# Patient Record
Sex: MALE | URBAN - METROPOLITAN AREA
[De-identification: names, ages, dates, MRNs, and addresses within clinical notes are randomized per-mention and may not be internally consistent; named-entity substitution may affect disease eponyms.]

---

## 2017-05-24 ENCOUNTER — OUTPATIENT (OUTPATIENT)
Dept: OUTPATIENT SERVICES | Facility: HOSPITAL | Age: 82
LOS: 1 days | Discharge: HOME | End: 2017-05-24
Payer: MEDICARE

## 2017-05-24 VITALS
TEMPERATURE: 97 F | HEART RATE: 48 BPM | OXYGEN SATURATION: 99 % | DIASTOLIC BLOOD PRESSURE: 76 MMHG | HEIGHT: 70 IN | SYSTOLIC BLOOD PRESSURE: 155 MMHG | WEIGHT: 184.97 LBS | RESPIRATION RATE: 20 BRPM

## 2017-05-24 DIAGNOSIS — E78.5 HYPERLIPIDEMIA, UNSPECIFIED: ICD-10-CM

## 2017-05-24 DIAGNOSIS — E86.0 DEHYDRATION: ICD-10-CM

## 2017-05-24 DIAGNOSIS — Z98.890 OTHER SPECIFIED POSTPROCEDURAL STATES: Chronic | ICD-10-CM

## 2017-05-24 DIAGNOSIS — H91.93 UNSPECIFIED HEARING LOSS, BILATERAL: ICD-10-CM

## 2017-05-24 DIAGNOSIS — Z90.49 ACQUIRED ABSENCE OF OTHER SPECIFIED PARTS OF DIGESTIVE TRACT: Chronic | ICD-10-CM

## 2017-05-24 DIAGNOSIS — I10 ESSENTIAL (PRIMARY) HYPERTENSION: ICD-10-CM

## 2017-05-24 DIAGNOSIS — Z01.818 ENCOUNTER FOR OTHER PREPROCEDURAL EXAMINATION: ICD-10-CM

## 2017-05-24 DIAGNOSIS — M17.11 UNILATERAL PRIMARY OSTEOARTHRITIS, RIGHT KNEE: ICD-10-CM

## 2017-05-24 DIAGNOSIS — D62 ACUTE POSTHEMORRHAGIC ANEMIA: ICD-10-CM

## 2017-05-24 DIAGNOSIS — Z90.49 ACQUIRED ABSENCE OF OTHER SPECIFIED PARTS OF DIGESTIVE TRACT: ICD-10-CM

## 2017-05-24 DIAGNOSIS — Z90.89 ACQUIRED ABSENCE OF OTHER ORGANS: Chronic | ICD-10-CM

## 2017-05-24 DIAGNOSIS — Z90.3 ACQUIRED ABSENCE OF STOMACH [PART OF]: Chronic | ICD-10-CM

## 2017-05-24 LAB
ANION GAP SERPL CALC-SCNC: 6 MMOL/L — SIGNIFICANT CHANGE UP (ref 5–17)
APPEARANCE UR: CLEAR — SIGNIFICANT CHANGE UP
BACTERIA # UR AUTO: (no result)
BASOPHILS # BLD AUTO: 0.1 K/UL — SIGNIFICANT CHANGE UP (ref 0–0.2)
BASOPHILS NFR BLD AUTO: 1 % — SIGNIFICANT CHANGE UP (ref 0–2)
BILIRUB UR-MCNC: NEGATIVE — SIGNIFICANT CHANGE UP
BLD GP AB SCN SERPL QL: SIGNIFICANT CHANGE UP
BUN SERPL-MCNC: 20 MG/DL — SIGNIFICANT CHANGE UP (ref 7–23)
CALCIUM SERPL-MCNC: 8.9 MG/DL — SIGNIFICANT CHANGE UP (ref 8.5–10.1)
CHLORIDE SERPL-SCNC: 106 MMOL/L — SIGNIFICANT CHANGE UP (ref 96–108)
CO2 SERPL-SCNC: 29 MMOL/L — SIGNIFICANT CHANGE UP (ref 22–31)
COLOR SPEC: YELLOW — SIGNIFICANT CHANGE UP
CREAT SERPL-MCNC: 1 MG/DL — SIGNIFICANT CHANGE UP (ref 0.5–1.3)
DIFF PNL FLD: (no result)
EOSINOPHIL # BLD AUTO: 0.3 K/UL — SIGNIFICANT CHANGE UP (ref 0–0.5)
EOSINOPHIL NFR BLD AUTO: 5.7 % — SIGNIFICANT CHANGE UP (ref 0–6)
GLUCOSE SERPL-MCNC: 92 MG/DL — SIGNIFICANT CHANGE UP (ref 70–99)
GLUCOSE UR QL: NEGATIVE MG/DL — SIGNIFICANT CHANGE UP
HCT VFR BLD CALC: 46.8 % — SIGNIFICANT CHANGE UP (ref 39–50)
HGB BLD-MCNC: 15.7 G/DL — SIGNIFICANT CHANGE UP (ref 13–17)
KETONES UR-MCNC: NEGATIVE — SIGNIFICANT CHANGE UP
LEUKOCYTE ESTERASE UR-ACNC: NEGATIVE — SIGNIFICANT CHANGE UP
LYMPHOCYTES # BLD AUTO: 1.9 K/UL — SIGNIFICANT CHANGE UP (ref 1–3.3)
LYMPHOCYTES # BLD AUTO: 33.8 % — SIGNIFICANT CHANGE UP (ref 13–44)
MCHC RBC-ENTMCNC: 32.8 PG — SIGNIFICANT CHANGE UP (ref 27–34)
MCHC RBC-ENTMCNC: 33.6 GM/DL — SIGNIFICANT CHANGE UP (ref 32–36)
MCV RBC AUTO: 97.7 FL — SIGNIFICANT CHANGE UP (ref 80–100)
MONOCYTES # BLD AUTO: 0.4 K/UL — SIGNIFICANT CHANGE UP (ref 0–0.9)
MONOCYTES NFR BLD AUTO: 6.8 % — SIGNIFICANT CHANGE UP (ref 2–14)
MRSA PCR RESULT.: SIGNIFICANT CHANGE UP
NEUTROPHILS # BLD AUTO: 3 K/UL — SIGNIFICANT CHANGE UP (ref 1.8–7.4)
NEUTROPHILS NFR BLD AUTO: 52.7 % — SIGNIFICANT CHANGE UP (ref 43–77)
NITRITE UR-MCNC: NEGATIVE — SIGNIFICANT CHANGE UP
PH UR: 6 — SIGNIFICANT CHANGE UP (ref 5–8)
PLATELET # BLD AUTO: 213 K/UL — SIGNIFICANT CHANGE UP (ref 150–400)
POTASSIUM SERPL-MCNC: 4.8 MMOL/L — SIGNIFICANT CHANGE UP (ref 3.5–5.3)
POTASSIUM SERPL-SCNC: 4.8 MMOL/L — SIGNIFICANT CHANGE UP (ref 3.5–5.3)
PROT UR-MCNC: 30 MG/DL
RBC # BLD: 4.79 M/UL — SIGNIFICANT CHANGE UP (ref 4.2–5.8)
RBC # FLD: 12.2 % — SIGNIFICANT CHANGE UP (ref 10.3–14.5)
RBC CASTS # UR COMP ASSIST: (no result) /HPF (ref 0–4)
S AUREUS DNA NOSE QL NAA+PROBE: SIGNIFICANT CHANGE UP
SODIUM SERPL-SCNC: 141 MMOL/L — SIGNIFICANT CHANGE UP (ref 135–145)
SP GR SPEC: 1.01 — SIGNIFICANT CHANGE UP (ref 1.01–1.02)
TYPE + AB SCN PNL BLD: SIGNIFICANT CHANGE UP
UROBILINOGEN FLD QL: NEGATIVE MG/DL — SIGNIFICANT CHANGE UP
WBC # BLD: 5.7 K/UL — SIGNIFICANT CHANGE UP (ref 3.8–10.5)
WBC # FLD AUTO: 5.7 K/UL — SIGNIFICANT CHANGE UP (ref 3.8–10.5)
WBC UR QL: SIGNIFICANT CHANGE UP

## 2017-05-24 PROCEDURE — 71020: CPT | Mod: 26

## 2017-05-24 NOTE — H&P PST ADULT - HISTORY OF PRESENT ILLNESS
84 y/o male with osteoarthritis of right knee. Complain of chronic right knee pain. Here today for PST for right TKR.

## 2017-05-24 NOTE — H&P PST ADULT - NSANTHOSAYNRD_GEN_A_CORE
No. VERO screening performed.  STOP BANG Legend: 0-2 = LOW Risk; 3-4 = INTERMEDIATE Risk; 5-8 = HIGH Risk

## 2017-05-24 NOTE — H&P PST ADULT - ABILITY TO HEAR (WITH HEARING AID OR HEARING APPLIANCE IF NORMALLY USED):
b/l hearing aid/Mildly to Moderately Impaired: difficulty hearing in some environments or speaker may need to increase volume or speak distinctly

## 2017-05-24 NOTE — H&P PST ADULT - PSH
History of cholecystectomy  1955  History of lumbar laminectomy  2015  S/P colonoscopy  2014  S/P partial gastrectomy  2015  S/P tonsillectomy  as a child

## 2017-05-24 NOTE — H&P PST ADULT - ASSESSMENT
86 y/o male with osteoarthritis of right knee. Complain of chronic right knee pain. Scheduled for right TKR with Dr. Fuentes.    Plan  1. Stop all NSAIDS, herbal supplements and vitamins for 7 days.  2. NPO at midnight.  3. Use EZ sponges as directed  4. Use mupirocin as directed

## 2017-05-24 NOTE — H&P PST ADULT - PMH
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

## 2017-06-07 RX ORDER — SODIUM CHLORIDE 9 MG/ML
3 INJECTION INTRAMUSCULAR; INTRAVENOUS; SUBCUTANEOUS EVERY 8 HOURS
Qty: 0 | Refills: 0 | Status: DISCONTINUED | OUTPATIENT
Start: 2017-06-08 | End: 2017-06-11

## 2017-06-07 RX ORDER — CELECOXIB 200 MG/1
200 CAPSULE ORAL ONCE
Qty: 0 | Refills: 0 | Status: COMPLETED | OUTPATIENT
Start: 2017-06-08 | End: 2017-06-08

## 2017-06-07 RX ORDER — ACETAMINOPHEN 500 MG
650 TABLET ORAL ONCE
Qty: 0 | Refills: 0 | Status: COMPLETED | OUTPATIENT
Start: 2017-06-08 | End: 2017-06-08

## 2017-06-07 RX ORDER — FAMOTIDINE 10 MG/ML
20 INJECTION INTRAVENOUS ONCE
Qty: 0 | Refills: 0 | Status: COMPLETED | OUTPATIENT
Start: 2017-06-08 | End: 2017-06-08

## 2017-06-08 ENCOUNTER — RESULT REVIEW (OUTPATIENT)
Age: 82
End: 2017-06-08

## 2017-06-08 ENCOUNTER — INPATIENT (INPATIENT)
Facility: HOSPITAL | Age: 82
LOS: 2 days | Discharge: HOME HEALTH | End: 2017-06-11
Attending: ORTHOPAEDIC SURGERY | Admitting: ORTHOPAEDIC SURGERY
Payer: MEDICARE

## 2017-06-08 VITALS
OXYGEN SATURATION: 99 % | HEART RATE: 50 BPM | HEIGHT: 70 IN | SYSTOLIC BLOOD PRESSURE: 128 MMHG | DIASTOLIC BLOOD PRESSURE: 79 MMHG | WEIGHT: 182.1 LBS | RESPIRATION RATE: 16 BRPM | TEMPERATURE: 98 F

## 2017-06-08 DIAGNOSIS — Z90.3 ACQUIRED ABSENCE OF STOMACH [PART OF]: Chronic | ICD-10-CM

## 2017-06-08 DIAGNOSIS — Z90.89 ACQUIRED ABSENCE OF OTHER ORGANS: Chronic | ICD-10-CM

## 2017-06-08 DIAGNOSIS — Z98.890 OTHER SPECIFIED POSTPROCEDURAL STATES: Chronic | ICD-10-CM

## 2017-06-08 DIAGNOSIS — Z90.49 ACQUIRED ABSENCE OF OTHER SPECIFIED PARTS OF DIGESTIVE TRACT: Chronic | ICD-10-CM

## 2017-06-08 LAB
ANION GAP SERPL CALC-SCNC: 6 MMOL/L — SIGNIFICANT CHANGE UP (ref 5–17)
BUN SERPL-MCNC: 16 MG/DL — SIGNIFICANT CHANGE UP (ref 7–23)
CALCIUM SERPL-MCNC: 7.8 MG/DL — LOW (ref 8.5–10.1)
CHLORIDE SERPL-SCNC: 112 MMOL/L — HIGH (ref 96–108)
CO2 SERPL-SCNC: 25 MMOL/L — SIGNIFICANT CHANGE UP (ref 22–31)
CREAT SERPL-MCNC: 0.88 MG/DL — SIGNIFICANT CHANGE UP (ref 0.5–1.3)
GLUCOSE SERPL-MCNC: 117 MG/DL — HIGH (ref 70–99)
HCT VFR BLD CALC: 39.2 % — SIGNIFICANT CHANGE UP (ref 39–50)
HGB BLD-MCNC: 13.3 G/DL — SIGNIFICANT CHANGE UP (ref 13–17)
INR BLD: 1.01 RATIO — SIGNIFICANT CHANGE UP (ref 0.88–1.16)
INR BLD: 1.08 RATIO — SIGNIFICANT CHANGE UP (ref 0.88–1.16)
MCHC RBC-ENTMCNC: 32.5 PG — SIGNIFICANT CHANGE UP (ref 27–34)
MCHC RBC-ENTMCNC: 34 GM/DL — SIGNIFICANT CHANGE UP (ref 32–36)
MCV RBC AUTO: 95.5 FL — SIGNIFICANT CHANGE UP (ref 80–100)
PLATELET # BLD AUTO: 185 K/UL — SIGNIFICANT CHANGE UP (ref 150–400)
POTASSIUM SERPL-MCNC: 4.2 MMOL/L — SIGNIFICANT CHANGE UP (ref 3.5–5.3)
POTASSIUM SERPL-SCNC: 4.2 MMOL/L — SIGNIFICANT CHANGE UP (ref 3.5–5.3)
PROTHROM AB SERPL-ACNC: 10.9 SEC — SIGNIFICANT CHANGE UP (ref 9.8–12.7)
PROTHROM AB SERPL-ACNC: 11.7 SEC — SIGNIFICANT CHANGE UP (ref 9.8–12.7)
RBC # BLD: 4.1 M/UL — LOW (ref 4.2–5.8)
RBC # FLD: 12.2 % — SIGNIFICANT CHANGE UP (ref 10.3–14.5)
SODIUM SERPL-SCNC: 143 MMOL/L — SIGNIFICANT CHANGE UP (ref 135–145)
WBC # BLD: 6.3 K/UL — SIGNIFICANT CHANGE UP (ref 3.8–10.5)
WBC # FLD AUTO: 6.3 K/UL — SIGNIFICANT CHANGE UP (ref 3.8–10.5)

## 2017-06-08 PROCEDURE — 73560 X-RAY EXAM OF KNEE 1 OR 2: CPT | Mod: 26,RT

## 2017-06-08 PROCEDURE — 88305 TISSUE EXAM BY PATHOLOGIST: CPT | Mod: 26

## 2017-06-08 PROCEDURE — 99223 1ST HOSP IP/OBS HIGH 75: CPT

## 2017-06-08 RX ORDER — WARFARIN SODIUM 2.5 MG/1
5 TABLET ORAL DAILY
Qty: 0 | Refills: 0 | Status: DISCONTINUED | OUTPATIENT
Start: 2017-06-08 | End: 2017-06-10

## 2017-06-08 RX ORDER — SODIUM CHLORIDE 9 MG/ML
1000 INJECTION INTRAMUSCULAR; INTRAVENOUS; SUBCUTANEOUS
Qty: 0 | Refills: 0 | Status: DISCONTINUED | OUTPATIENT
Start: 2017-06-08 | End: 2017-06-11

## 2017-06-08 RX ORDER — HEPARIN SODIUM 5000 [USP'U]/ML
5000 INJECTION INTRAVENOUS; SUBCUTANEOUS EVERY 8 HOURS
Qty: 0 | Refills: 0 | Status: DISCONTINUED | OUTPATIENT
Start: 2017-06-09 | End: 2017-06-11

## 2017-06-08 RX ORDER — ACETAMINOPHEN 500 MG
650 TABLET ORAL EVERY 6 HOURS
Qty: 0 | Refills: 0 | Status: DISCONTINUED | OUTPATIENT
Start: 2017-06-08 | End: 2017-06-11

## 2017-06-08 RX ORDER — DIPHENHYDRAMINE HCL 50 MG
25 CAPSULE ORAL AT BEDTIME
Qty: 0 | Refills: 0 | Status: DISCONTINUED | OUTPATIENT
Start: 2017-06-08 | End: 2017-06-11

## 2017-06-08 RX ORDER — SENNA PLUS 8.6 MG/1
2 TABLET ORAL AT BEDTIME
Qty: 0 | Refills: 0 | Status: DISCONTINUED | OUTPATIENT
Start: 2017-06-08 | End: 2017-06-11

## 2017-06-08 RX ORDER — DOCUSATE SODIUM 100 MG
100 CAPSULE ORAL THREE TIMES A DAY
Qty: 0 | Refills: 0 | Status: DISCONTINUED | OUTPATIENT
Start: 2017-06-08 | End: 2017-06-11

## 2017-06-08 RX ORDER — FINASTERIDE 5 MG/1
5 TABLET, FILM COATED ORAL DAILY
Qty: 0 | Refills: 0 | Status: DISCONTINUED | OUTPATIENT
Start: 2017-06-08 | End: 2017-06-11

## 2017-06-08 RX ORDER — FERROUS SULFATE 325(65) MG
325 TABLET ORAL
Qty: 0 | Refills: 0 | Status: DISCONTINUED | OUTPATIENT
Start: 2017-06-08 | End: 2017-06-11

## 2017-06-08 RX ORDER — POLYETHYLENE GLYCOL 3350 17 G/17G
17 POWDER, FOR SOLUTION ORAL DAILY
Qty: 0 | Refills: 0 | Status: DISCONTINUED | OUTPATIENT
Start: 2017-06-08 | End: 2017-06-11

## 2017-06-08 RX ORDER — ONDANSETRON 8 MG/1
4 TABLET, FILM COATED ORAL EVERY 6 HOURS
Qty: 0 | Refills: 0 | Status: DISCONTINUED | OUTPATIENT
Start: 2017-06-08 | End: 2017-06-11

## 2017-06-08 RX ORDER — CELECOXIB 200 MG/1
200 CAPSULE ORAL
Qty: 0 | Refills: 0 | Status: DISCONTINUED | OUTPATIENT
Start: 2017-06-08 | End: 2017-06-08

## 2017-06-08 RX ORDER — FOLIC ACID 0.8 MG
1 TABLET ORAL DAILY
Qty: 0 | Refills: 0 | Status: DISCONTINUED | OUTPATIENT
Start: 2017-06-08 | End: 2017-06-11

## 2017-06-08 RX ORDER — CEFAZOLIN SODIUM 1 G
2000 VIAL (EA) INJECTION EVERY 8 HOURS
Qty: 0 | Refills: 0 | Status: COMPLETED | OUTPATIENT
Start: 2017-06-08 | End: 2017-06-09

## 2017-06-08 RX ORDER — ASCORBIC ACID 60 MG
500 TABLET,CHEWABLE ORAL
Qty: 0 | Refills: 0 | Status: DISCONTINUED | OUTPATIENT
Start: 2017-06-08 | End: 2017-06-11

## 2017-06-08 RX ORDER — ONDANSETRON 8 MG/1
4 TABLET, FILM COATED ORAL ONCE
Qty: 0 | Refills: 0 | Status: COMPLETED | OUTPATIENT
Start: 2017-06-08 | End: 2017-06-08

## 2017-06-08 RX ORDER — SODIUM CHLORIDE 9 MG/ML
1000 INJECTION INTRAMUSCULAR; INTRAVENOUS; SUBCUTANEOUS
Qty: 0 | Refills: 0 | Status: DISCONTINUED | OUTPATIENT
Start: 2017-06-08 | End: 2017-06-08

## 2017-06-08 RX ORDER — FENTANYL CITRATE 50 UG/ML
50 INJECTION INTRAVENOUS
Qty: 0 | Refills: 0 | Status: DISCONTINUED | OUTPATIENT
Start: 2017-06-08 | End: 2017-06-08

## 2017-06-08 RX ORDER — MAGNESIUM HYDROXIDE 400 MG/1
30 TABLET, CHEWABLE ORAL DAILY
Qty: 0 | Refills: 0 | Status: DISCONTINUED | OUTPATIENT
Start: 2017-06-08 | End: 2017-06-11

## 2017-06-08 RX ORDER — ACETAMINOPHEN 500 MG
1000 TABLET ORAL ONCE
Qty: 0 | Refills: 0 | Status: COMPLETED | OUTPATIENT
Start: 2017-06-08 | End: 2017-06-08

## 2017-06-08 RX ORDER — PANTOPRAZOLE SODIUM 20 MG/1
40 TABLET, DELAYED RELEASE ORAL DAILY
Qty: 0 | Refills: 0 | Status: DISCONTINUED | OUTPATIENT
Start: 2017-06-08 | End: 2017-06-11

## 2017-06-08 RX ADMIN — Medication 650 MILLIGRAM(S): at 23:00

## 2017-06-08 RX ADMIN — CELECOXIB 200 MILLIGRAM(S): 200 CAPSULE ORAL at 08:58

## 2017-06-08 RX ADMIN — Medication 100 MILLIGRAM(S): at 17:56

## 2017-06-08 RX ADMIN — WARFARIN SODIUM 5 MILLIGRAM(S): 2.5 TABLET ORAL at 17:06

## 2017-06-08 RX ADMIN — SODIUM CHLORIDE 3 MILLILITER(S): 9 INJECTION INTRAMUSCULAR; INTRAVENOUS; SUBCUTANEOUS at 22:24

## 2017-06-08 RX ADMIN — ONDANSETRON 4 MILLIGRAM(S): 8 TABLET, FILM COATED ORAL at 15:04

## 2017-06-08 RX ADMIN — Medication 400 MILLIGRAM(S): at 16:34

## 2017-06-08 RX ADMIN — SODIUM CHLORIDE 100 MILLILITER(S): 9 INJECTION INTRAMUSCULAR; INTRAVENOUS; SUBCUTANEOUS at 16:34

## 2017-06-08 RX ADMIN — FAMOTIDINE 20 MILLIGRAM(S): 10 INJECTION INTRAVENOUS at 08:58

## 2017-06-08 RX ADMIN — Medication 650 MILLIGRAM(S): at 08:57

## 2017-06-08 RX ADMIN — Medication 650 MILLIGRAM(S): at 22:28

## 2017-06-08 NOTE — PROGRESS NOTE ADULT - SUBJECTIVE AND OBJECTIVE BOX
Pt seen at bedside doing well, min pain right knee denies N/T    PE Right knee  drain and dressing c/d/i   compartments soft non tender   + EHL FHL GS TA   FROm ankle and toes  DP intact   sensation intact

## 2017-06-08 NOTE — CONSULT NOTE ADULT - SUBJECTIVE AND OBJECTIVE BOX
PCP- DR Manan Nelson    CC- s/p RT TKR    HPI:  87yo/M with PMH HTN, hyperlipidemia, OA presented for elective RT TKR. Medical consult called for postop medical management. Pt seen in PACU, still numb on the RLE. Denies chest pain or SOB    PMH- as above  PSH- cholecystectomy, laminectomy, partial gastrectomy, tonsillectomy  Soc hx- denies smoking, alcohol-socially  Fam hx- non-contributory    Review of system- All 10 systems reviewed and is as per HPI otherwise negative.     T(C): 36.6, Max: 36.6 (06-08 @ 08:58)  HR: 54 (50 - 56)  BP: 136/63 (126/86 - 142/68)  RR: 15 (15 - 21)  SpO2: 97% (97% - 100%)  Wt(kg): --    LABS:                        13.3   6.3   )-----------( 185      ( 08 Jun 2017 15:18 )             39.2     PT/INR - ( 08 Jun 2017 15:18 )   PT: 11.7 sec;   INR: 1.08 ratio      RADIOLOGY & ADDITIONAL TESTS:    PHYSICAL EXAM:  GENERAL: NAD, well-groomed, well-developed  HEAD:  Atraumatic, Normocephalic  EYES: EOMI, PERRLA, conjunctiva and sclera clear  HEENT: Moist mucous membranes  NECK: Supple, No JVD  NERVOUS SYSTEM:  Alert & Oriented X3, Motor Strength 5/5 B/L upper and lower extremities; DTRs 2+ intact and symmetric  CHEST/LUNG: Clear to auscultation bilaterally; No rales, rhonchi, wheezing, or rubs  HEART: Regular rate and rhythm; No murmurs, rubs, or gallops  ABDOMEN: Soft, Nontender, Nondistended; Bowel sounds present  GENITOURINARY- Voiding, no palpable bladder  EXTREMITIES:  2+ Peripheral Pulses, No clubbing, cyanosis, or edema  MUSCULOSKELTAL- RT knee dressing dry, +hemovac  SKIN-no rash, no lesion  CNS- alert, oriented X3, non focal     Daily Height in cm: 177.8 (08 Jun 2017 08:58)      sodium chloride 0.9% lock flush 3milliLiter(s) IV Push every 8 hours  sodium chloride 0.9%. 1000milliLiter(s) IV Continuous <Continuous>  fentaNYL    Injectable 50MICROGram(s) IV Push every 5 minutes PRN  acetaminophen   Tablet. 650milliGRAM(s) Oral every 6 hours  celecoxib 200milliGRAM(s) Oral with breakfast  warfarin 5milliGRAM(s) Oral daily        Assessment/Plan  #S/p RT TKR  Ortho eval appreciated  PT as tolerated  Pain meds prn  AC consult  Monitor HH  Bowel regimen  Incentive spirometry    #HTN/hyperlipidemia  Stable    #Dispo- thank you for consult, will follow with you

## 2017-06-08 NOTE — CONSULT NOTE ADULT - ASSESSMENT
This is an 86 year old male s/p right total knee replacement on 6/8 with Dr. Fuentes with high thrombosis risk and low/mod bleeding risk requiring anticoagulation with coumadin.    Discussed the necessity of VTE prophylaxis with coumadin. Educated patient on INR, value, meaning, and effects medications and foods may have on it. Will further reinforce coumadin education and follow up on outpatient basis.     Plan:  Coumadin 5 mg PO daily x 4 weeks total adjust dose per INR  Heparin 5,000 units SQ Q8hour, first dose tomorrow am  Daily PT/INR  Daily CBC/BMP  Enc ambulation  Venodynes    Thank you for this consult, will continue to follow.

## 2017-06-08 NOTE — PROGRESS NOTE ADULT - ASSESSMENT
86M s/p Right TKA     P:  WBAT   therapy   pain control   DVt ppx   post op abx   venodynes  incentive spirometer  no pillow under knee   follow labs

## 2017-06-08 NOTE — CONSULT NOTE ADULT - SUBJECTIVE AND OBJECTIVE BOX
CC: right knee pain    HPI:  This is an 86 year old active male with right knee pain now s/p right total knee replacement with Dr. Fuentes. PMH significant for gastric tumor with partial gastrectomy, cholecystectomy, and lumbar lami. Denies any bleeding or clotting disorders.     Consulted by Dr. Fuentes   for VTE prophylaxis, risk stratification, and anticoagulation management.    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    Allergies    aspirin (Unknown)  codeine (Nausea)  Originally Entered as [Unknown] reaction to [mono sodium glutinate] (Unknown)    Intolerances    REVIEW OF SYSTEMS    (  )Fever	     (  )Constipation	(  )SOB				(  )Headache	(  )Dysuria  (  )Chills	     (  )Melena	(  )Dyspnea present on exertion	                    (  )Dizziness                    (  )Polyuria  (  )Nausea	     (  )Hematochezia	(  )Cough			                    (  )Syncope   	(  )Hematuria  (  )Vomiting    (  )Chest Pain	(  )Wheezing			(  )Weakness  (  )Diarrhea     (  )Palpitations	(  )Anorexia			(  )Myalgia    All other review of systems negative: Yes    Unable to obtain review of systems due to:      PHYSICAL EXAM:    Constitutional: Appears Well    Neurological: A& O x 3    Skin: Warm    Respiratory and Thorax: normal effort; Breath sounds: normal; No rales/wheezing/rhonchi  	  Cardiovascular: S1, S2, regular, NMBR	MP: SBR 58 bpm, no ectopy    Gastrointestinal: BS + x 4Q, nontender	    Genitourinary:  Bladder nondistended, nontender    Musculoskeletal:   General Right:   + muscle/joint tenderness,   normal tone, no joint swelling,   ROM: limited        Knee:  Right: Incision: ; Dressing CDI; Drain: hemovac ; Type of drng.: serosang.; Amt. of drng: small           Lower extrems:   Right: no calf tenderness              negative loren's sign               + pedal pulses    Left:   no calf tenderness              negative loren's sign               + pedal pulses      PT/INR - ( 08 Jun 2017 08:28 )   PT: 10.9 sec;   INR: 1.01 ratio    			    MEDICATIONS  (STANDING):  sodium chloride 0.9% lock flush 3milliLiter(s) IV Push every 8 hours  sodium chloride 0.9%. 1000milliLiter(s) IV Continuous <Continuous>  acetaminophen   Tablet. 650milliGRAM(s) Oral every 6 hours  celecoxib 200milliGRAM(s) Oral with breakfast      Vital Signs Last 24 Hrs  T(C): 36.6, Max: 36.6 (06-08 @ 08:58)  T(F): 97.8, Max: 97.8 (06-08 @ 08:58)  HR: 54 (50 - 56)  BP: 135/71 (126/86 - 135/71)  BP(mean): --  RR: 19 (16 - 21)  SpO2: 99% (99% - 100%)    DVT Prophylaxis:  LMWH                   (  )  Heparin SQ           ( x )  Coumadin             ( x )  Xarelto                  (  )  Eliquis                   (  )  Venodynes           ( x )  Ambulation          ( x )  UFH                       (  )  Contraindicated  (  ) CC: right knee pain    HPI:  This is an 86 year old active male with right knee pain now s/p right total knee replacement with Dr. Fuentes. PMH significant for gastric tumor with partial gastrectomy, cholecystectomy, and lumbar lami. Denies any bleeding or clotting disorders.     Consulted by Dr. Fuentes   for VTE prophylaxis, risk stratification, and anticoagulation management.    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    Allergies    aspirin (Unknown)  codeine (Nausea)  Originally Entered as [Unknown] reaction to [mono sodium glutinate] (Unknown)    Intolerances    REVIEW OF SYSTEMS    (  )Fever	     (  )Constipation	(  )SOB				(  )Headache	(  )Dysuria  (  )Chills	     (  )Melena	(  )Dyspnea present on exertion	                    (  )Dizziness                    (  )Polyuria  (  )Nausea	     (  )Hematochezia	(  )Cough			                    (  )Syncope   	(  )Hematuria  (  )Vomiting    (  )Chest Pain	(  )Wheezing			(  )Weakness  (  )Diarrhea     (  )Palpitations	(  )Anorexia			(  )Myalgia    All other review of systems negative: Yes    Unable to obtain review of systems due to:      PHYSICAL EXAM:    Constitutional: Appears Well    Neurological: A& O x 3    Skin: Warm    Respiratory and Thorax: normal effort; Breath sounds: normal; No rales/wheezing/rhonchi  	  Cardiovascular: S1, S2, regular, NMBR	MP: SBR 58 bpm, no ectopy    Gastrointestinal: BS + x 4Q, nontender	    Genitourinary:  Bladder nondistended, nontender    Musculoskeletal:   General Right:   + muscle/joint tenderness,   normal tone, no joint swelling,   ROM: limited        Knee:  Right: Incision: ; Dressing CDI; Drain: hemovac ; Type of drng.: serosang.; Amt. of drng: small           Lower extrems:   Right: no calf tenderness              negative loren's sign               + pedal pulses    Left:   no calf tenderness              negative loren's sign               + pedal pulses      PT/INR - ( 08 Jun 2017 08:28 )   PT: 10.9 sec;   INR: 1.01 ratio    			  MEDICATIONS  (STANDING):  sodium chloride 0.9% lock flush 3milliLiter(s) IV Push every 8 hours  sodium chloride 0.9%. 1000milliLiter(s) IV Continuous <Continuous>  acetaminophen   Tablet. 650milliGRAM(s) Oral every 6 hours  celecoxib 200milliGRAM(s) Oral with breakfast  warfarin 5milliGRAM(s) Oral daily      Vital Signs Last 24 Hrs  T(C): 36.6, Max: 36.6 (06-08 @ 08:58)  T(F): 97.8, Max: 97.8 (06-08 @ 08:58)  HR: 54 (50 - 56)  BP: 135/71 (126/86 - 135/71)  BP(mean): --  RR: 19 (16 - 21)  SpO2: 99% (99% - 100%)    DVT Prophylaxis:  LMWH                   (  )  Heparin SQ           ( x )  Coumadin             ( x )  Xarelto                  (  )  Eliquis                   (  )  Venodynes           ( x )  Ambulation          ( x )  UFH                       (  )  Contraindicated  (  )

## 2017-06-09 LAB
ANION GAP SERPL CALC-SCNC: 3 MMOL/L — LOW (ref 5–17)
BUN SERPL-MCNC: 13 MG/DL — SIGNIFICANT CHANGE UP (ref 7–23)
CALCIUM SERPL-MCNC: 7.7 MG/DL — LOW (ref 8.5–10.1)
CHLORIDE SERPL-SCNC: 113 MMOL/L — HIGH (ref 96–108)
CO2 SERPL-SCNC: 29 MMOL/L — SIGNIFICANT CHANGE UP (ref 22–31)
CREAT SERPL-MCNC: 0.9 MG/DL — SIGNIFICANT CHANGE UP (ref 0.5–1.3)
GLUCOSE SERPL-MCNC: 97 MG/DL — SIGNIFICANT CHANGE UP (ref 70–99)
HCT VFR BLD CALC: 36.5 % — LOW (ref 39–50)
HGB BLD-MCNC: 12.3 G/DL — LOW (ref 13–17)
INR BLD: 1.14 RATIO — SIGNIFICANT CHANGE UP (ref 0.88–1.16)
MCHC RBC-ENTMCNC: 32.7 PG — SIGNIFICANT CHANGE UP (ref 27–34)
MCHC RBC-ENTMCNC: 33.8 GM/DL — SIGNIFICANT CHANGE UP (ref 32–36)
MCV RBC AUTO: 96.8 FL — SIGNIFICANT CHANGE UP (ref 80–100)
PLATELET # BLD AUTO: 161 K/UL — SIGNIFICANT CHANGE UP (ref 150–400)
POTASSIUM SERPL-MCNC: 4.4 MMOL/L — SIGNIFICANT CHANGE UP (ref 3.5–5.3)
POTASSIUM SERPL-SCNC: 4.4 MMOL/L — SIGNIFICANT CHANGE UP (ref 3.5–5.3)
PROTHROM AB SERPL-ACNC: 12.3 SEC — SIGNIFICANT CHANGE UP (ref 9.8–12.7)
RBC # BLD: 3.77 M/UL — LOW (ref 4.2–5.8)
RBC # FLD: 12.2 % — SIGNIFICANT CHANGE UP (ref 10.3–14.5)
SODIUM SERPL-SCNC: 145 MMOL/L — SIGNIFICANT CHANGE UP (ref 135–145)
WBC # BLD: 8.8 K/UL — SIGNIFICANT CHANGE UP (ref 3.8–10.5)
WBC # FLD AUTO: 8.8 K/UL — SIGNIFICANT CHANGE UP (ref 3.8–10.5)

## 2017-06-09 PROCEDURE — 99233 SBSQ HOSP IP/OBS HIGH 50: CPT

## 2017-06-09 RX ORDER — SODIUM CHLORIDE 9 MG/ML
500 INJECTION INTRAMUSCULAR; INTRAVENOUS; SUBCUTANEOUS ONCE
Qty: 0 | Refills: 0 | Status: COMPLETED | OUTPATIENT
Start: 2017-06-09 | End: 2017-06-09

## 2017-06-09 RX ORDER — ACETAMINOPHEN 500 MG
1000 TABLET ORAL ONCE
Qty: 0 | Refills: 0 | Status: COMPLETED | OUTPATIENT
Start: 2017-06-09 | End: 2017-06-09

## 2017-06-09 RX ORDER — OXYCODONE HYDROCHLORIDE 5 MG/1
10 TABLET ORAL EVERY 6 HOURS
Qty: 0 | Refills: 0 | Status: DISCONTINUED | OUTPATIENT
Start: 2017-06-09 | End: 2017-06-11

## 2017-06-09 RX ORDER — OXYCODONE HYDROCHLORIDE 5 MG/1
5 TABLET ORAL
Qty: 0 | Refills: 0 | Status: DISCONTINUED | OUTPATIENT
Start: 2017-06-09 | End: 2017-06-11

## 2017-06-09 RX ADMIN — HEPARIN SODIUM 5000 UNIT(S): 5000 INJECTION INTRAVENOUS; SUBCUTANEOUS at 14:55

## 2017-06-09 RX ADMIN — Medication 1 TABLET(S): at 10:45

## 2017-06-09 RX ADMIN — Medication 100 MILLIGRAM(S): at 02:30

## 2017-06-09 RX ADMIN — Medication 1000 MILLIGRAM(S): at 03:31

## 2017-06-09 RX ADMIN — SODIUM CHLORIDE 3 MILLILITER(S): 9 INJECTION INTRAMUSCULAR; INTRAVENOUS; SUBCUTANEOUS at 22:58

## 2017-06-09 RX ADMIN — FINASTERIDE 5 MILLIGRAM(S): 5 TABLET, FILM COATED ORAL at 10:45

## 2017-06-09 RX ADMIN — Medication 500 MILLIGRAM(S): at 18:29

## 2017-06-09 RX ADMIN — Medication 1 MILLIGRAM(S): at 10:44

## 2017-06-09 RX ADMIN — WARFARIN SODIUM 5 MILLIGRAM(S): 2.5 TABLET ORAL at 23:09

## 2017-06-09 RX ADMIN — POLYETHYLENE GLYCOL 3350 17 GRAM(S): 17 POWDER, FOR SOLUTION ORAL at 10:45

## 2017-06-09 RX ADMIN — Medication 650 MILLIGRAM(S): at 23:10

## 2017-06-09 RX ADMIN — PANTOPRAZOLE SODIUM 40 MILLIGRAM(S): 20 TABLET, DELAYED RELEASE ORAL at 10:44

## 2017-06-09 RX ADMIN — Medication 400 MILLIGRAM(S): at 02:59

## 2017-06-09 RX ADMIN — Medication 500 MILLIGRAM(S): at 06:07

## 2017-06-09 RX ADMIN — HEPARIN SODIUM 5000 UNIT(S): 5000 INJECTION INTRAVENOUS; SUBCUTANEOUS at 23:09

## 2017-06-09 RX ADMIN — OXYCODONE HYDROCHLORIDE 5 MILLIGRAM(S): 5 TABLET ORAL at 19:35

## 2017-06-09 RX ADMIN — Medication 325 MILLIGRAM(S): at 18:29

## 2017-06-09 RX ADMIN — Medication 650 MILLIGRAM(S): at 08:47

## 2017-06-09 RX ADMIN — HEPARIN SODIUM 5000 UNIT(S): 5000 INJECTION INTRAVENOUS; SUBCUTANEOUS at 06:07

## 2017-06-09 RX ADMIN — Medication 650 MILLIGRAM(S): at 14:55

## 2017-06-09 RX ADMIN — SODIUM CHLORIDE 3 MILLILITER(S): 9 INJECTION INTRAMUSCULAR; INTRAVENOUS; SUBCUTANEOUS at 05:42

## 2017-06-09 RX ADMIN — SODIUM CHLORIDE 500 MILLILITER(S): 9 INJECTION INTRAMUSCULAR; INTRAVENOUS; SUBCUTANEOUS at 10:25

## 2017-06-09 RX ADMIN — Medication 100 MILLIGRAM(S): at 14:55

## 2017-06-09 NOTE — DISCHARGE NOTE ADULT - PLAN OF CARE
reduce pain, return to ADLs 1.	ROM 0-full as tolerated, gently increase daily. Knee Immobilizer only while asleep for 3 weeks (for Dr. Fuentes total knee arthroplasty).  2.	Walking with full weight bearing as tolerated, with rolling walker  3.	DVT Prophylaxis per anticoagulation team recommendations  4.	PT Daily  5.	Follow up with your orthopedic surgeon in 10-14 Days after Discharge from the Hospital. Call Office For Appointment.   6.	Remove Staples Post-Op Day 14. Remove old and place new Aquacel  Bandage Q7days until staples removed.   7.	Ice plenty  8.	OK to shower as long as Aquacel Bandage is used for wound.

## 2017-06-09 NOTE — PROGRESS NOTE ADULT - SUBJECTIVE AND OBJECTIVE BOX
Pt seen & examined. Pain controlled. No acute events overnight  All vital signs stable  Gen: NAD  RLE:  Dressing clean D&I  +sensation L2-S1  +dorsiflexion/plantarflexion of ankle/hallux  +dorsalis pedis pulse  Soft compartments, - calf tenderness

## 2017-06-09 NOTE — DISCHARGE NOTE ADULT - MEDICATION SUMMARY - MEDICATIONS TO TAKE
I will START or STAY ON the medications listed below when I get home from the hospital:    finasteride 5 mg oral tablet  -- 1 tab(s) by mouth once a day  -- Indication: For per pmd    Percocet 5/325 oral tablet  -- 1 tab(s) by mouth every 4 hours, As Needed -for severe pain MDD:6  -- Caution federal law prohibits the transfer of this drug to any person other  than the person for whom it was prescribed.  May cause drowsiness.  Alcohol may intensify this effect.  Use care when operating dangerous machinery.  This prescription cannot be refilled.  This product contains acetaminophen.  Do not use  with any other product containing acetaminophen to prevent possible liver damage.  Using more of this medication than prescribed may cause serious breathing problems.    -- Indication: For prn pain

## 2017-06-09 NOTE — DISCHARGE NOTE ADULT - CARE PROVIDER_API CALL
Sebastien uFentes (MD), Orthopaedic Surgery  [address] [place] [address]  Phone: [phone]  Fax: [phone]

## 2017-06-09 NOTE — PROVIDER CONTACT NOTE (OTHER) - BACKGROUND
CATARACTS, OU: VISUALLY SIGNIFICANT. SURGERY INDICATED. PATIENT WISHES TO WAIT AT THIS TIME. GLASSES PRESCRIPTION GIVEN. PATIENT TO CALL BACK IF THEY DECIDE TO PROCEED WITH SURGERY WITHIN 90 DAYS. OTHERWISE, FOLLOW AS SCHEDULED. pt ambulating with PT became dizzy

## 2017-06-09 NOTE — DISCHARGE NOTE ADULT - CARE PLAN
Principal Discharge DX:	Status post total right knee replacement  Goal:	reduce pain, return to ADLs  Instructions for follow-up, activity and diet:	1.	ROM 0-full as tolerated, gently increase daily. Knee Immobilizer only while asleep for 3 weeks (for Dr. Fuentes total knee arthroplasty).  2.	Walking with full weight bearing as tolerated, with rolling walker  3.	DVT Prophylaxis per anticoagulation team recommendations  4.	PT Daily  5.	Follow up with your orthopedic surgeon in 10-14 Days after Discharge from the Hospital. Call Office For Appointment.   6.	Remove Staples Post-Op Day 14. Remove old and place new Aquacel  Bandage Q7days until staples removed.   7.	Ice plenty  8.	OK to shower as long as Aquacel Bandage is used for wound.

## 2017-06-09 NOTE — PROGRESS NOTE ADULT - SUBJECTIVE AND OBJECTIVE BOX
CC: right knee pain    HPI:  This is an 86 year old active male with right knee pain now s/p right total knee replacement with Dr. Fuentes. PMH significant for gastric tumor with partial gastrectomy, cholecystectomy, and lumbar lami. Denies any bleeding or clotting disorders.     Consulted by Dr. Fuentes   for VTE prophylaxis, risk stratification, and anticoagulation management.    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 Discussed anticoagulation with coumadin and overlap with heparin. Also did lovenox teaching and discussion with patient and wife- thinks his daughter in law will be doing injections when he goes home Sunday. Hemovac still in place draining today.    FAMILY HISTORY:    Denies any personal or familial history of clotting or bleeding disorders.    Allergies    aspirin (Unknown)  codeine (Nausea)  Originally Entered as [Unknown] reaction to [mono sodium glutinate] (Unknown)    Intolerances    REVIEW OF SYSTEMS    (  )Fever	     (  )Constipation	(  )SOB				(  )Headache	(  )Dysuria  (  )Chills	     (  )Melena	(  )Dyspnea present on exertion	                    (  )Dizziness                    (  )Polyuria  (  )Nausea	     (  )Hematochezia	(  )Cough			                    (  )Syncope   	(  )Hematuria  (  )Vomiting    (  )Chest Pain	(  )Wheezing			(  )Weakness  (  )Diarrhea     (  )Palpitations	(  )Anorexia			(  )Myalgia    All other review of systems negative: Yes      PHYSICAL EXAM:    Constitutional: Appears Well    Neurological: A& O x 3    Skin: Warm    Respiratory and Thorax: normal effort; Breath sounds: normal; No rales/wheezing/rhonchi  	  Cardiovascular: S1, S2, regular, NMBR	    Gastrointestinal: BS + x 4Q, nontender	    Genitourinary:  Bladder nondistended, nontender    Musculoskeletal:   General Right:   + muscle/joint tenderness,   normal tone, no joint swelling,   ROM: limited      Knee:  Right: Incision: ; Dressing CDI; Drain: hemovac ; Type of drng.: serosang.; Amt. of drng: small           Lower extrems:   Right: no calf tenderness              negative loren's sign               + pedal pulses    Left:   no calf tenderness              negative loren's sign               + pedal pulses      PT/INR - ( 08 Jun 2017 08:28 )   PT: 10.9 sec;   INR: 1.01 ratio    			  MEDICATIONS  (STANDING):  sodium chloride 0.9% lock flush 3milliLiter(s) IV Push every 8 hours  sodium chloride 0.9%. 1000milliLiter(s) IV Continuous <Continuous>  acetaminophen   Tablet. 650milliGRAM(s) Oral every 6 hours  celecoxib 200milliGRAM(s) Oral with breakfast  warfarin 5milliGRAM(s) Oral daily      Vital Signs Last 24 Hrs  T(C): 36.8, Max: 36.8 (06-09 @ 08:06)  T(F): 98.3, Max: 98.3 (06-09 @ 08:06)  HR: 53 (50 - 62)  BP: 115/65 (108/53 - 150/80)  BP(mean): 77 (77 - 77)  RR: 16 (11 - 21)  SpO2: 100% (97% - 100%)    DVT Prophylaxis:  LMWH                   (  )  Heparin SQ           ( x )  Coumadin             ( x )  Xarelto                  (  )  Eliquis                   (  )  Venodynes           ( x )  Ambulation          ( x )  UFH                       (  )  Contraindicated  (  )

## 2017-06-09 NOTE — DISCHARGE NOTE ADULT - HOSPITAL COURSE
The patient is a 86 year old male status post elective Total Knee Arthroplasty to the right knee after failing outpatient non-operative conservative management.  Patient presented to [facility] after being medically cleared for an elective surgical procedure. The patient was taken to the operating room on date mentioned above. Prophylactic antibiotics were started before the procedure and continued for 24 hours.  There were no complications during the procedure and patient tolerated the procedure well.  The patient was transferred to recovery room in stable condition and subsequently to surgical floor.  Patient was placed on heparin/coumadin for anticoagulation.  All home medications were continued.  The patient received physical therapy daily and daily labs were followed.  The dressing was kept clean, dry, intact.  The rest of the hospital stay was unremarkable.

## 2017-06-09 NOTE — PROGRESS NOTE ADULT - SUBJECTIVE AND OBJECTIVE BOX
PCP- DR Manan Nelson    CC- s/p RT TKR    HPI:  85yo/M with PMH HTN, hyperlipidemia, OA presented for elective RT TKR. Medical consult called for postop medical management. Pt seen in PACU, still numb on the RLE. Denies chest pain or SOB    PMH- as above  PSH- cholecystectomy, laminectomy, partial gastrectomy, tonsillectomy  Soc hx- denies smoking, alcohol-socially  Fam hx- non-contributory    6/9/17- felt dizzy earlier after PT    Review of system- All 10 systems reviewed and is as per HPI otherwise negative.     Vital Signs Last 24 Hrs  T(C): 36.8, Max: 36.8 (06-09 @ 08:06)  T(F): 98.3, Max: 98.3 (06-09 @ 08:06)  HR: 53 (50 - 62)  BP: 115/65 (108/53 - 150/80)  BP(mean): 77 (77 - 77)  RR: 16 (11 - 21)  SpO2: 100% (97% - 100%)  LABS:                        12.3   8.8   )-----------( 161      ( 09 Jun 2017 05:45 )             36.5     09 Jun 2017 05:45    145    |  113    |  13     ----------------------------<  97     4.4     |  29     |  0.90     Ca    7.7        09 Jun 2017 05:45  PT/INR - ( 09 Jun 2017 05:45 )   PT: 12.3 sec;   INR: 1.14 ratio      RADIOLOGY & ADDITIONAL TESTS:    PHYSICAL EXAM:  GENERAL: NAD, well-groomed, well-developed  HEAD:  Atraumatic, Normocephalic  EYES: EOMI, PERRLA, conjunctiva and sclera clear  HEENT: Moist mucous membranes  NECK: Supple, No JVD  NERVOUS SYSTEM:  Alert & Oriented X3, Motor Strength 5/5 B/L upper and lower extremities; DTRs 2+ intact and symmetric  CHEST/LUNG: Clear to auscultation bilaterally; No rales, rhonchi, wheezing, or rubs  HEART: Regular rate and rhythm; No murmurs, rubs, or gallops  ABDOMEN: Soft, Nontender, Nondistended; Bowel sounds present  GENITOURINARY- Voiding, no palpable bladder  EXTREMITIES:  2+ Peripheral Pulses, No clubbing, cyanosis, or edema  MUSCULOSKELTAL- RT knee dressing dry, +hemovac  SKIN-no rash, no lesion  CNS- alert, oriented X3, non focal     Daily Height in cm: 177.8 (08 Jun 2017 08:58)      sodium chloride 0.9% lock flush 3milliLiter(s) IV Push every 8 hours  sodium chloride 0.9%. 1000milliLiter(s) IV Continuous <Continuous>  fentaNYL    Injectable 50MICROGram(s) IV Push every 5 minutes PRN  acetaminophen   Tablet. 650milliGRAM(s) Oral every 6 hours  celecoxib 200milliGRAM(s) Oral with breakfast  warfarin 5milliGRAM(s) Oral daily    Assessment/Plan  #S/p RT TKR  Ortho eval appreciated  PT as tolerated  Pain meds prn  AC consult  Monitor HH  Bowel regimen  Incentive spirometry    #Dizziness likely 2 to dehydration  IVF bolus    #HTN/hyperlipidemia  Stable    #Dispo- cont PT, likely home on Sunday

## 2017-06-09 NOTE — PROGRESS NOTE ADULT - ASSESSMENT
This is an 86 year old male s/p right total knee replacement on 6/8 with Dr. Fuentes with high thrombosis risk and low/mod bleeding risk requiring anticoagulation with coumadin.    Discussed the necessity of VTE prophylaxis with coumadin. Educated patient on INR, value, meaning, and effects medications and foods may have on it. Will further reinforce coumadin education and follow up on outpatient basis.     Plan:  Coumadin 5 mg PO daily x 4 weeks total adjust dose per INR  Heparin 5,000 units SQ [id number] until INR 1.7 or >  Daily PT/INR  Daily CBC/BMP  Enc ambulation  Venodynes    Will continue to follow.

## 2017-06-10 LAB
ANION GAP SERPL CALC-SCNC: 3 MMOL/L — LOW (ref 5–17)
BUN SERPL-MCNC: 11 MG/DL — SIGNIFICANT CHANGE UP (ref 7–23)
CALCIUM SERPL-MCNC: 7.9 MG/DL — LOW (ref 8.5–10.1)
CHLORIDE SERPL-SCNC: 111 MMOL/L — HIGH (ref 96–108)
CO2 SERPL-SCNC: 28 MMOL/L — SIGNIFICANT CHANGE UP (ref 22–31)
CREAT SERPL-MCNC: 0.91 MG/DL — SIGNIFICANT CHANGE UP (ref 0.5–1.3)
GLUCOSE SERPL-MCNC: 142 MG/DL — HIGH (ref 70–99)
HCT VFR BLD CALC: 33.4 % — LOW (ref 39–50)
HGB BLD-MCNC: 11.6 G/DL — LOW (ref 13–17)
INR BLD: 1.25 RATIO — HIGH (ref 0.88–1.16)
MCHC RBC-ENTMCNC: 32.9 PG — SIGNIFICANT CHANGE UP (ref 27–34)
MCHC RBC-ENTMCNC: 34.9 GM/DL — SIGNIFICANT CHANGE UP (ref 32–36)
MCV RBC AUTO: 94.3 FL — SIGNIFICANT CHANGE UP (ref 80–100)
PLATELET # BLD AUTO: 161 K/UL — SIGNIFICANT CHANGE UP (ref 150–400)
POTASSIUM SERPL-MCNC: 4 MMOL/L — SIGNIFICANT CHANGE UP (ref 3.5–5.3)
POTASSIUM SERPL-SCNC: 4 MMOL/L — SIGNIFICANT CHANGE UP (ref 3.5–5.3)
PROTHROM AB SERPL-ACNC: 13.6 SEC — HIGH (ref 9.8–12.7)
RBC # BLD: 3.54 M/UL — LOW (ref 4.2–5.8)
RBC # FLD: 12.2 % — SIGNIFICANT CHANGE UP (ref 10.3–14.5)
SODIUM SERPL-SCNC: 142 MMOL/L — SIGNIFICANT CHANGE UP (ref 135–145)
WBC # BLD: 9.1 K/UL — SIGNIFICANT CHANGE UP (ref 3.8–10.5)
WBC # FLD AUTO: 9.1 K/UL — SIGNIFICANT CHANGE UP (ref 3.8–10.5)

## 2017-06-10 PROCEDURE — 99233 SBSQ HOSP IP/OBS HIGH 50: CPT

## 2017-06-10 RX ORDER — WARFARIN SODIUM 2.5 MG/1
1 TABLET ORAL
Qty: 60 | Refills: 1 | OUTPATIENT
Start: 2017-06-10 | End: 2017-08-08

## 2017-06-10 RX ORDER — WARFARIN SODIUM 2.5 MG/1
7.5 TABLET ORAL DAILY
Qty: 0 | Refills: 0 | Status: DISCONTINUED | OUTPATIENT
Start: 2017-06-10 | End: 2017-06-11

## 2017-06-10 RX ORDER — ENOXAPARIN SODIUM 100 MG/ML
30 INJECTION SUBCUTANEOUS
Qty: 10 | Refills: 1 | OUTPATIENT
Start: 2017-06-10 | End: 2017-06-19

## 2017-06-10 RX ADMIN — SODIUM CHLORIDE 3 MILLILITER(S): 9 INJECTION INTRAMUSCULAR; INTRAVENOUS; SUBCUTANEOUS at 14:17

## 2017-06-10 RX ADMIN — POLYETHYLENE GLYCOL 3350 17 GRAM(S): 17 POWDER, FOR SOLUTION ORAL at 14:13

## 2017-06-10 RX ADMIN — SODIUM CHLORIDE 3 MILLILITER(S): 9 INJECTION INTRAMUSCULAR; INTRAVENOUS; SUBCUTANEOUS at 22:03

## 2017-06-10 RX ADMIN — Medication 650 MILLIGRAM(S): at 11:24

## 2017-06-10 RX ADMIN — Medication 500 MILLIGRAM(S): at 18:24

## 2017-06-10 RX ADMIN — Medication 650 MILLIGRAM(S): at 14:11

## 2017-06-10 RX ADMIN — PANTOPRAZOLE SODIUM 40 MILLIGRAM(S): 20 TABLET, DELAYED RELEASE ORAL at 11:24

## 2017-06-10 RX ADMIN — FINASTERIDE 5 MILLIGRAM(S): 5 TABLET, FILM COATED ORAL at 11:24

## 2017-06-10 RX ADMIN — Medication 650 MILLIGRAM(S): at 19:07

## 2017-06-10 RX ADMIN — SODIUM CHLORIDE 3 MILLILITER(S): 9 INJECTION INTRAMUSCULAR; INTRAVENOUS; SUBCUTANEOUS at 05:53

## 2017-06-10 RX ADMIN — WARFARIN SODIUM 7.5 MILLIGRAM(S): 2.5 TABLET ORAL at 21:34

## 2017-06-10 RX ADMIN — Medication 325 MILLIGRAM(S): at 18:24

## 2017-06-10 RX ADMIN — HEPARIN SODIUM 5000 UNIT(S): 5000 INJECTION INTRAVENOUS; SUBCUTANEOUS at 21:34

## 2017-06-10 RX ADMIN — HEPARIN SODIUM 5000 UNIT(S): 5000 INJECTION INTRAVENOUS; SUBCUTANEOUS at 14:13

## 2017-06-10 RX ADMIN — Medication 650 MILLIGRAM(S): at 06:03

## 2017-06-10 RX ADMIN — Medication 100 MILLIGRAM(S): at 14:13

## 2017-06-10 RX ADMIN — Medication 1 TABLET(S): at 11:24

## 2017-06-10 RX ADMIN — Medication 325 MILLIGRAM(S): at 09:23

## 2017-06-10 RX ADMIN — Medication 100 MILLIGRAM(S): at 21:34

## 2017-06-10 RX ADMIN — Medication 650 MILLIGRAM(S): at 18:24

## 2017-06-10 RX ADMIN — OXYCODONE HYDROCHLORIDE 5 MILLIGRAM(S): 5 TABLET ORAL at 09:23

## 2017-06-10 RX ADMIN — Medication 1 MILLIGRAM(S): at 11:24

## 2017-06-10 RX ADMIN — Medication 325 MILLIGRAM(S): at 14:13

## 2017-06-10 RX ADMIN — Medication 500 MILLIGRAM(S): at 06:02

## 2017-06-10 RX ADMIN — HEPARIN SODIUM 5000 UNIT(S): 5000 INJECTION INTRAVENOUS; SUBCUTANEOUS at 06:02

## 2017-06-10 NOTE — PROGRESS NOTE ADULT - SUBJECTIVE AND OBJECTIVE BOX
CC: right knee pain    HPI:  This is an 86 year old active male with right knee pain now s/p right total knee replacement with Dr. Fuentes. PMH significant for gastric tumor with partial gastrectomy, cholecystectomy, and lumbar lami. Denies any bleeding or clotting disorders.     Consulted by Dr. Fuentes for VTE prophylaxis, risk stratification, and anticoagulation management.    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 Discussed anticoagulation with coumadin and overlap with heparin. Also did lovenox teaching and discussion with patient and wife- thinks his daughter in law will be doing injections when he goes home Sunday. Hemovac still in place draining today.    6/10: Patient seen at bedside, reports minimal pain- posterior knee. We discussed anticoagulation with coumadin and lovenox once home- no issues. Is awaiting wife to come in to figure out which pharmacy to send scripts to.    FAMILY HISTORY:    Denies any personal or familial history of clotting or bleeding disorders.    Allergies    aspirin (Unknown)  codeine (Nausea)  Originally Entered as [Unknown] reaction to [mono sodium glutinate] (Unknown)    Intolerances    REVIEW OF SYSTEMS    (  )Fever	     (  )Constipation	(  )SOB				(  )Headache	(  )Dysuria  (  )Chills	     (  )Melena	(  )Dyspnea present on exertion	                    (  )Dizziness                    (  )Polyuria  (  )Nausea	     (  )Hematochezia	(  )Cough			                    (  )Syncope   	(  )Hematuria  (  )Vomiting    (  )Chest Pain	(  )Wheezing			(  )Weakness  (  )Diarrhea     (  )Palpitations	(  )Anorexia			(  )Myalgia    All other review of systems negative: Yes      PHYSICAL EXAM:    Constitutional: Appears Well    Neurological: A& O x 3    Skin: Warm    Respiratory and Thorax: normal effort; Breath sounds: normal; No rales/wheezing/rhonchi  	  Cardiovascular: S1, S2, regular, NMBR	    Gastrointestinal: BS + x 4Q, nontender	    Genitourinary:  Bladder nondistended, nontender    Musculoskeletal:   General Right:   + muscle/joint tenderness,   normal tone, no joint swelling,   ROM: limited      Knee:  Right: Incision: ; Dressing CDI; Drain: hemovac ; Type of drng.: serosang.; Amt. of drng: small           Lower extrems:   Right: no calf tenderness              negative loren's sign               + pedal pulses    Left:   no calf tenderness              negative loren's sign               + pedal pulses                            12.3   8.8   )-----------( 161      ( 09 Jun 2017 05:45 )             36.5       06-10    142  |  111<H>  |  11  ----------------------------<  142<H>  4.0   |  28  |  0.91    Ca    7.9<L>      10 Julio 2017 06:16        PT/INR - ( 10 Julio 2017 06:16 )   PT: 13.6 sec;   INR: 1.25 ratio           PT/INR - ( 08 Jun 2017 08:28 )   PT: 10.9 sec;   INR: 1.01 ratio    			  MEDICATIONS  (STANDING):  sodium chloride 0.9% lock flush 3milliLiter(s) IV Push every 8 hours  acetaminophen   Tablet. 650milliGRAM(s) Oral every 6 hours  sodium chloride 0.9%. 1000milliLiter(s) IV Continuous <Continuous>  pantoprazole    Tablet 40milliGRAM(s) Oral daily  polyethylene glycol 3350 17Gram(s) Oral daily  docusate sodium 100milliGRAM(s) Oral three times a day  ferrous    sulfate 325milliGRAM(s) Oral three times a day with meals  folic acid 1milliGRAM(s) Oral daily  multivitamin 1Tablet(s) Oral daily  ascorbic acid 500milliGRAM(s) Oral two times a day  heparin  Injectable 5000Unit(s) SubCutaneous every 8 hours  finasteride 5milliGRAM(s) Oral daily  warfarin 7.5milliGRAM(s) Oral daily        Vital Signs Last 24 Hrs  T(C): 37.1, Max: 37.6 (06-09 @ 23:50)  T(F): 98.8, Max: 99.7 (06-09 @ 23:50)  HR: 56 (54 - 61)  BP: 144/63 (112/55 - 144/63)  BP(mean): --  RR: 18 (18 - 18)  SpO2: 94% (94% - 100%)    DVT Prophylaxis:  LMWH                   (  )  Heparin SQ           ( x )  Coumadin             ( x )  Xarelto                  (  )  Eliquis                   (  )  Venodynes           ( x )  Ambulation          ( x )  UFH                       (  )  Contraindicated  (  )

## 2017-06-10 NOTE — PROGRESS NOTE ADULT - ASSESSMENT
This is an 86 year old male s/p right total knee replacement on 6/8 with Dr. Fuentes with high thrombosis risk and low/mod bleeding risk requiring anticoagulation with coumadin.    Discussed the necessity of VTE prophylaxis with coumadin. Educated patient on INR, value, meaning, and effects medications and foods may have on it. Will further reinforce coumadin education and follow up on outpatient basis.     Plan:  Coumadin 7.5mg PO tonight  Heparin 5,000 units SQ [id number] until INR 1.7 or >  Daily PT/INR  Daily CBC/BMP  Enc ambulation  Venodynes    Will continue to follow.

## 2017-06-10 NOTE — PROGRESS NOTE ADULT - SUBJECTIVE AND OBJECTIVE BOX
PCP- DR Manan Nelson    CC- s/p RT TKR    HPI:  87yo/M with PMH HTN, hyperlipidemia, OA presented for elective RT TKR. Medical consult called for postop medical management. Pt seen in PACU, still numb on the RLE. Denies chest pain or SOB    PMH- as above  PSH- cholecystectomy, laminectomy, partial gastrectomy, tonsillectomy  Soc hx- denies smoking, alcohol-socially  Fam hx- non-contributory    6/9/17- felt dizzy earlier after PT  6/10/17- doing good    Review of system- All 10 systems reviewed and is as per HPI otherwise negative.     Vital Signs Last 24 Hrs  T(C): 37.1, Max: 37.6 (06-09 @ 23:50)  T(F): 98.8, Max: 99.7 (06-09 @ 23:50)  HR: 56 (54 - 61)  BP: 144/63 (112/55 - 144/63)  BP(mean): --  RR: 18 (18 - 18)  SpO2: 94% (94% - 100%)    LABS:                        11.6   9.1   )-----------( 161      ( 10 Julio 2017 06:16 )             33.4     10 Julio 2017 06:16    142    |  111    |  11     ----------------------------<  142    4.0     |  28     |  0.91     Ca    7.9        10 Julio 2017 06:16    PT/INR - ( 10 Julio 2017 06:16 )   PT: 13.6 sec;   INR: 1.25 ratio      RADIOLOGY & ADDITIONAL TESTS:    PHYSICAL EXAM:  GENERAL: NAD, well-groomed, well-developed  HEAD:  Atraumatic, Normocephalic  EYES: EOMI, PERRLA, conjunctiva and sclera clear  HEENT: Moist mucous membranes  NECK: Supple, No JVD  NERVOUS SYSTEM:  Alert & Oriented X3, Motor Strength 5/5 B/L upper and lower extremities; DTRs 2+ intact and symmetric  CHEST/LUNG: Clear to auscultation bilaterally; No rales, rhonchi, wheezing, or rubs  HEART: Regular rate and rhythm; No murmurs, rubs, or gallops  ABDOMEN: Soft, Nontender, Nondistended; Bowel sounds present  GENITOURINARY- Voiding, no palpable bladder  EXTREMITIES:  2+ Peripheral Pulses, No clubbing, cyanosis, or edema  MUSCULOSKELTAL- RT knee dressing dry  SKIN-no rash, no lesion  CNS- alert, oriented X3, non focal     Daily Height in cm: 177.8 (08 Jun 2017 08:58)      sodium chloride 0.9% lock flush 3milliLiter(s) IV Push every 8 hours  sodium chloride 0.9%. 1000milliLiter(s) IV Continuous <Continuous>  fentaNYL    Injectable 50MICROGram(s) IV Push every 5 minutes PRN  acetaminophen   Tablet. 650milliGRAM(s) Oral every 6 hours  celecoxib 200milliGRAM(s) Oral with breakfast  warfarin 5milliGRAM(s) Oral daily    Assessment/Plan  #S/p RT TKR/anemia acute blood loss postop  Ortho eval appreciated  PT as tolerated  Pain meds prn  AC consult  Monitor   Bowel regimen  Incentive spirometry    #Dizziness likely 2 to dehydration- resolved    #HTN/hyperlipidemia  Stable    #Dispo- likely home with home PT tomorrow

## 2017-06-11 VITALS
HEART RATE: 58 BPM | RESPIRATION RATE: 18 BRPM | OXYGEN SATURATION: 98 % | SYSTOLIC BLOOD PRESSURE: 136 MMHG | TEMPERATURE: 99 F | DIASTOLIC BLOOD PRESSURE: 64 MMHG

## 2017-06-11 LAB
INR BLD: 1.41 RATIO — HIGH (ref 0.88–1.16)
PROTHROM AB SERPL-ACNC: 15.3 SEC — HIGH (ref 9.8–12.7)

## 2017-06-11 PROCEDURE — 99233 SBSQ HOSP IP/OBS HIGH 50: CPT

## 2017-06-11 RX ORDER — MUPIROCIN 20 MG/G
0 OINTMENT TOPICAL
Qty: 0 | Refills: 0 | COMMUNITY

## 2017-06-11 RX ORDER — TRAMADOL HYDROCHLORIDE 50 MG/1
1 TABLET ORAL
Qty: 42 | Refills: 0
Start: 2017-06-11 | End: 2017-06-18

## 2017-06-11 RX ADMIN — Medication 325 MILLIGRAM(S): at 13:32

## 2017-06-11 RX ADMIN — Medication 500 MILLIGRAM(S): at 05:32

## 2017-06-11 RX ADMIN — Medication 1 MILLIGRAM(S): at 11:02

## 2017-06-11 RX ADMIN — HEPARIN SODIUM 5000 UNIT(S): 5000 INJECTION INTRAVENOUS; SUBCUTANEOUS at 05:32

## 2017-06-11 RX ADMIN — Medication 650 MILLIGRAM(S): at 00:00

## 2017-06-11 RX ADMIN — Medication 100 MILLIGRAM(S): at 05:32

## 2017-06-11 RX ADMIN — Medication 100 MILLIGRAM(S): at 13:32

## 2017-06-11 RX ADMIN — PANTOPRAZOLE SODIUM 40 MILLIGRAM(S): 20 TABLET, DELAYED RELEASE ORAL at 11:02

## 2017-06-11 RX ADMIN — Medication 1 TABLET(S): at 11:01

## 2017-06-11 RX ADMIN — SODIUM CHLORIDE 3 MILLILITER(S): 9 INJECTION INTRAMUSCULAR; INTRAVENOUS; SUBCUTANEOUS at 05:33

## 2017-06-11 RX ADMIN — Medication 650 MILLIGRAM(S): at 05:32

## 2017-06-11 RX ADMIN — Medication 650 MILLIGRAM(S): at 11:01

## 2017-06-11 RX ADMIN — POLYETHYLENE GLYCOL 3350 17 GRAM(S): 17 POWDER, FOR SOLUTION ORAL at 11:03

## 2017-06-11 RX ADMIN — FINASTERIDE 5 MILLIGRAM(S): 5 TABLET, FILM COATED ORAL at 11:01

## 2017-06-11 NOTE — PROGRESS NOTE ADULT - SUBJECTIVE AND OBJECTIVE BOX
PCP- DR Manan Nelson    CC- s/p RT TKR    HPI:  85yo/M with PMH HTN, hyperlipidemia, OA presented for elective RT TKR. Medical consult called for postop medical management. Pt seen in PACU, still numb on the RLE. Denies chest pain or SOB    PMH- as above  PSH- cholecystectomy, laminectomy, partial gastrectomy, tonsillectomy  Soc hx- denies smoking, alcohol-socially  Fam hx- non-contributory    6/9/17- felt dizzy earlier after PT  6/10/17- doing good  6/11/17- doing OK, going home today    Review of system- All 10 systems reviewed and is as per HPI otherwise negative.     Vital Signs Last 24 Hrs  T(C): 37.1, Max: 37.1 (06-11 @ 07:03)  T(F): 98.7, Max: 98.7 (06-11 @ 07:03)  HR: 58 (57 - 59)  BP: 136/64 (123/56 - 137/58)  BP(mean): --  RR: 18 (18 - 18)  SpO2: 98% (96% - 100%)    LABS:                        11.6   9.1   )-----------( 161      ( 10 Julio 2017 06:16 )             33.4     10 Julio 2017 06:16    142    |  111    |  11     ----------------------------<  142    4.0     |  28     |  0.91     Ca    7.9        10 Julio 2017 06:16  PT/INR - ( 11 Jun 2017 05:50 )   PT: 15.3 sec;   INR: 1.41 ratio      RADIOLOGY & ADDITIONAL TESTS:    PHYSICAL EXAM:  GENERAL: NAD, well-groomed, well-developed  HEAD:  Atraumatic, Normocephalic  EYES: EOMI, PERRLA, conjunctiva and sclera clear  HEENT: Moist mucous membranes  NECK: Supple, No JVD  NERVOUS SYSTEM:  Alert & Oriented X3, Motor Strength 5/5 B/L upper and lower extremities; DTRs 2+ intact and symmetric  CHEST/LUNG: Clear to auscultation bilaterally; No rales, rhonchi, wheezing, or rubs  HEART: Regular rate and rhythm; No murmurs, rubs, or gallops  ABDOMEN: Soft, Nontender, Nondistended; Bowel sounds present  GENITOURINARY- Voiding, no palpable bladder  EXTREMITIES:  2+ Peripheral Pulses, No clubbing, cyanosis, or edema  MUSCULOSKELTAL- RT knee dressing dry  SKIN-no rash, no lesion  CNS- alert, oriented X3, non focal     Daily Height in cm: 177.8 (08 Jun 2017 08:58)      sodium chloride 0.9% lock flush 3milliLiter(s) IV Push every 8 hours  sodium chloride 0.9%. 1000milliLiter(s) IV Continuous <Continuous>  fentaNYL    Injectable 50MICROGram(s) IV Push every 5 minutes PRN  acetaminophen   Tablet. 650milliGRAM(s) Oral every 6 hours  celecoxib 200milliGRAM(s) Oral with breakfast  warfarin 5milliGRAM(s) Oral daily    Assessment/Plan  #S/p RT TKR/anemia acute blood loss postop  Ortho eval appreciated  PT as tolerated  Pain meds prn  AC consult  Monitor HH- stable  Bowel regimen  Incentive spirometry    #Dizziness likely 2 to dehydration- resolved    #HTN/hyperlipidemia  Stable    #Dispo- likely home with home PT today

## 2017-06-11 NOTE — PROGRESS NOTE ADULT - ASSESSMENT
This is an 86 year old male s/p right total knee replacement on 6/8 with Dr. Fuentes with high thrombosis risk and low/mod bleeding risk requiring anticoagulation with coumadin.    Discussed the necessity of VTE prophylaxis with coumadin. Educated patient on INR, value, meaning, and effects medications and foods may have on it. Will further reinforce coumadin education and follow up on outpatient basis.   6/11: Most likely going home today- coumadin dosing sheet provided. All prescriptions sent to pharmacy for lovenox/coumadin.    Plan:  Coumadin 7.5mg PO tonight  Heparin 5,000 units SQ [id number] until INR 1.7 or >  Daily PT/INR  Daily CBC/BMP  Enc ambulation  Venodynes    Will continue to follow.

## 2017-06-11 NOTE — PROGRESS NOTE ADULT - SUBJECTIVE AND OBJECTIVE BOX
CC: right knee pain    HPI:  This is an 86 year old active male with right knee pain now s/p right total knee replacement with Dr. Fuentes. PMH significant for gastric tumor with partial gastrectomy, cholecystectomy, and lumbar lami. Denies any bleeding or clotting disorders.     Consulted by Dr. Fuentes for VTE prophylaxis, risk stratification, and anticoagulation management.    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 Discussed anticoagulation with coumadin and overlap with heparin. Also did lovenox teaching and discussion with patient and wife- thinks his daughter in law will be doing injections when he goes home Sunday. Hemovac still in place draining today.    6/10: Patient seen at bedside, reports minimal pain- posterior knee. We discussed anticoagulation with coumadin and lovenox once home- no issues. Is awaiting wife to come in to figure out which pharmacy to send scripts to.    6/11: Patient seen at bedside- wife picked up prescriptions from pharmacy. Discussed INR 1.4 today- and lovenox coumadin overlap. Patient understand- provided dosing worksheet- lab to draw Tuesday and will javier him Wed. Patient verbalizes understanding    FAMILY HISTORY:    Denies any personal or familial history of clotting or bleeding disorders.    Allergies    aspirin (Unknown)  codeine (Nausea)  Originally Entered as [Unknown] reaction to [mono sodium glutinate] (Unknown)    Intolerances    REVIEW OF SYSTEMS    (  )Fever	     (  )Constipation	(  )SOB				(  )Headache	(  )Dysuria  (  )Chills	     (  )Melena	(  )Dyspnea present on exertion	                    (  )Dizziness                    (  )Polyuria  (  )Nausea	     (  )Hematochezia	(  )Cough			                    (  )Syncope   	(  )Hematuria  (  )Vomiting    (  )Chest Pain	(  )Wheezing			(  )Weakness  (  )Diarrhea     (  )Palpitations	(  )Anorexia			(  )Myalgia    All other review of systems negative: Yes      PHYSICAL EXAM:    Constitutional: Appears Well    Neurological: A& O x 3    Skin: Warm    Respiratory and Thorax: normal effort; Breath sounds: normal; No rales/wheezing/rhonchi  	  Cardiovascular: S1, S2, regular, NMBR	    Gastrointestinal: BS + x 4Q, nontender	    Genitourinary:  Bladder nondistended, nontender    Musculoskeletal:   General Right:   + muscle/joint tenderness,   normal tone, no joint swelling,   ROM: limited      Knee:  Right: Incision: ; Dressing CDI           Lower extrems:   Right: no calf tenderness              negative loren's sign               + pedal pulses    Left:   no calf tenderness              negative loren's sign               + pedal pulses                            11.6   9.1   )-----------( 161      ( 10 Julio 2017 06:16 )             33.4       06-10    142  |  111<H>  |  11  ----------------------------<  142<H>  4.0   |  28  |  0.91    Ca    7.9<L>      10 Julio 2017 06:16        PT/INR - ( 11 Jun 2017 05:50 )   PT: 15.3 sec;   INR: 1.41 ratio                PT/INR - ( 10 Julio 2017 06:16 )   PT: 13.6 sec;   INR: 1.25 ratio           PT/INR - ( 08 Jun 2017 08:28 )   PT: 10.9 sec;   INR: 1.01 ratio    			  MEDICATIONS  (STANDING):  sodium chloride 0.9% lock flush 3milliLiter(s) IV Push every 8 hours  acetaminophen   Tablet. 650milliGRAM(s) Oral every 6 hours  sodium chloride 0.9%. 1000milliLiter(s) IV Continuous <Continuous>  pantoprazole    Tablet 40milliGRAM(s) Oral daily  polyethylene glycol 3350 17Gram(s) Oral daily  docusate sodium 100milliGRAM(s) Oral three times a day  ferrous    sulfate 325milliGRAM(s) Oral three times a day with meals  folic acid 1milliGRAM(s) Oral daily  multivitamin 1Tablet(s) Oral daily  ascorbic acid 500milliGRAM(s) Oral two times a day  heparin  Injectable 5000Unit(s) SubCutaneous every 8 hours  finasteride 5milliGRAM(s) Oral daily  warfarin 7.5milliGRAM(s) Oral daily        Vital Signs Last 24 Hrs  T(C): 37.1, Max: 37.1 (06-11 @ 07:03)  T(F): 98.7, Max: 98.7 (06-11 @ 07:03)  HR: 58 (57 - 59)  BP: 136/64 (123/56 - 137/58)  BP(mean): --  RR: 18 (18 - 18)  SpO2: 98% (96% - 100%)    DVT Prophylaxis:  LMWH                   (  )  Heparin SQ           ( x )  Coumadin             ( x )  Xarelto                  (  )  Eliquis                   (  )  Venodynes           ( x )  Ambulation          ( x )  UFH                       (  )  Contraindicated  (  )

## 2017-06-12 LAB — SURGICAL PATHOLOGY FINAL REPORT - CH: SIGNIFICANT CHANGE UP

## 2017-06-14 DIAGNOSIS — Z85.828 PERSONAL HISTORY OF OTHER MALIGNANT NEOPLASM OF SKIN: ICD-10-CM

## 2017-06-14 DIAGNOSIS — M17.11 UNILATERAL PRIMARY OSTEOARTHRITIS, RIGHT KNEE: ICD-10-CM

## 2017-06-14 DIAGNOSIS — I10 ESSENTIAL (PRIMARY) HYPERTENSION: ICD-10-CM

## 2017-06-14 DIAGNOSIS — E86.0 DEHYDRATION: ICD-10-CM

## 2017-06-14 DIAGNOSIS — Z90.49 ACQUIRED ABSENCE OF OTHER SPECIFIED PARTS OF DIGESTIVE TRACT: ICD-10-CM

## 2017-06-14 DIAGNOSIS — Z88.5 ALLERGY STATUS TO NARCOTIC AGENT: ICD-10-CM

## 2017-06-14 DIAGNOSIS — E78.5 HYPERLIPIDEMIA, UNSPECIFIED: ICD-10-CM

## 2017-06-14 DIAGNOSIS — H91.93 UNSPECIFIED HEARING LOSS, BILATERAL: ICD-10-CM

## 2017-06-14 DIAGNOSIS — Z88.6 ALLERGY STATUS TO ANALGESIC AGENT: ICD-10-CM

## 2017-06-14 DIAGNOSIS — Z85.028 PERSONAL HISTORY OF OTHER MALIGNANT NEOPLASM OF STOMACH: ICD-10-CM

## 2017-06-14 DIAGNOSIS — D62 ACUTE POSTHEMORRHAGIC ANEMIA: ICD-10-CM

## 2017-10-02 ENCOUNTER — APPOINTMENT (OUTPATIENT)
Dept: DERMATOLOGY | Facility: CLINIC | Age: 82
End: 2017-10-02
Payer: MEDICARE

## 2017-10-02 VITALS — WEIGHT: 180 LBS | HEIGHT: 70 IN | BODY MASS INDEX: 25.77 KG/M2

## 2017-10-02 DIAGNOSIS — C44.310 BASAL CELL CARCINOMA OF SKIN OF UNSPECIFIED PARTS OF FACE: ICD-10-CM

## 2017-10-02 DIAGNOSIS — H91.90 UNSPECIFIED HEARING LOSS, UNSPECIFIED EAR: ICD-10-CM

## 2017-10-02 DIAGNOSIS — H54.7 UNSPECIFIED VISUAL LOSS: ICD-10-CM

## 2017-10-02 PROCEDURE — 99213 OFFICE O/P EST LOW 20 MIN: CPT

## 2018-04-26 ENCOUNTER — APPOINTMENT (OUTPATIENT)
Dept: DERMATOLOGY | Facility: CLINIC | Age: 83
End: 2018-04-26
Payer: MEDICARE

## 2018-04-26 PROCEDURE — 17000 DESTRUCT PREMALG LESION: CPT

## 2018-04-26 PROCEDURE — 99213 OFFICE O/P EST LOW 20 MIN: CPT | Mod: 25

## 2018-04-26 PROCEDURE — 17003 DESTRUCT PREMALG LES 2-14: CPT

## 2018-04-26 RX ORDER — METHYLPREDNISOLONE 4 MG/1
4 TABLET ORAL
Qty: 21 | Refills: 0 | Status: COMPLETED | COMMUNITY
Start: 2018-01-24

## 2018-04-26 RX ORDER — OSELTAMIVIR PHOSPHATE 75 MG/1
75 CAPSULE ORAL
Qty: 10 | Refills: 0 | Status: COMPLETED | COMMUNITY
Start: 2018-01-24

## 2018-04-26 RX ORDER — AZITHROMYCIN 250 MG/1
250 TABLET, FILM COATED ORAL
Qty: 6 | Refills: 0 | Status: COMPLETED | COMMUNITY
Start: 2018-01-24

## 2018-07-22 PROBLEM — C44.310 BASAL CELL CARCINOMA OF FACE: Status: RESOLVED | Noted: 2017-10-02 | Resolved: 2018-07-22

## 2018-10-25 ENCOUNTER — APPOINTMENT (OUTPATIENT)
Dept: DERMATOLOGY | Facility: CLINIC | Age: 83
End: 2018-10-25
Payer: MEDICARE

## 2018-10-25 DIAGNOSIS — B35.1 TINEA UNGUIUM: ICD-10-CM

## 2018-10-25 PROBLEM — C44.621 SQUAMOUS CELL CARCINOMA OF SKIN OF UNSPECIFIED UPPER LIMB, INCLUDING SHOULDER: Chronic | Status: ACTIVE | Noted: 2017-05-24

## 2018-10-25 PROBLEM — D49.0 NEOPLASM OF UNSPECIFIED BEHAVIOR OF DIGESTIVE SYSTEM: Chronic | Status: ACTIVE | Noted: 2017-05-24

## 2018-10-25 PROBLEM — M19.90 UNSPECIFIED OSTEOARTHRITIS, UNSPECIFIED SITE: Chronic | Status: ACTIVE | Noted: 2017-05-24

## 2018-10-25 PROBLEM — H91.90 UNSPECIFIED HEARING LOSS, UNSPECIFIED EAR: Chronic | Status: ACTIVE | Noted: 2017-05-24

## 2018-10-25 PROBLEM — K52.9 NONINFECTIVE GASTROENTERITIS AND COLITIS, UNSPECIFIED: Chronic | Status: ACTIVE | Noted: 2017-05-24

## 2018-10-25 PROCEDURE — 99213 OFFICE O/P EST LOW 20 MIN: CPT

## 2019-08-06 ENCOUNTER — INPATIENT (INPATIENT)
Facility: HOSPITAL | Age: 84
LOS: 1 days | Discharge: HOME | DRG: 244 | End: 2019-08-08
Attending: INTERNAL MEDICINE | Admitting: FAMILY MEDICINE
Payer: MEDICARE

## 2019-08-06 VITALS — WEIGHT: 179.9 LBS | HEIGHT: 70 IN

## 2019-08-06 DIAGNOSIS — Z90.89 ACQUIRED ABSENCE OF OTHER ORGANS: Chronic | ICD-10-CM

## 2019-08-06 DIAGNOSIS — Z90.3 ACQUIRED ABSENCE OF STOMACH [PART OF]: Chronic | ICD-10-CM

## 2019-08-06 DIAGNOSIS — R42 DIZZINESS AND GIDDINESS: ICD-10-CM

## 2019-08-06 DIAGNOSIS — Z90.49 ACQUIRED ABSENCE OF OTHER SPECIFIED PARTS OF DIGESTIVE TRACT: Chronic | ICD-10-CM

## 2019-08-06 DIAGNOSIS — Z98.890 OTHER SPECIFIED POSTPROCEDURAL STATES: Chronic | ICD-10-CM

## 2019-08-06 DIAGNOSIS — R06.02 SHORTNESS OF BREATH: ICD-10-CM

## 2019-08-06 DIAGNOSIS — R00.1 BRADYCARDIA, UNSPECIFIED: ICD-10-CM

## 2019-08-06 LAB — TROPONIN I SERPL-MCNC: <0.015 NG/ML — SIGNIFICANT CHANGE UP (ref 0.01–0.04)

## 2019-08-06 PROCEDURE — 93010 ELECTROCARDIOGRAM REPORT: CPT

## 2019-08-06 PROCEDURE — 83735 ASSAY OF MAGNESIUM: CPT

## 2019-08-06 PROCEDURE — 80048 BASIC METABOLIC PNL TOTAL CA: CPT

## 2019-08-06 PROCEDURE — 93880 EXTRACRANIAL BILAT STUDY: CPT

## 2019-08-06 PROCEDURE — 85730 THROMBOPLASTIN TIME PARTIAL: CPT

## 2019-08-06 PROCEDURE — 80061 LIPID PANEL: CPT

## 2019-08-06 PROCEDURE — 93005 ELECTROCARDIOGRAM TRACING: CPT

## 2019-08-06 PROCEDURE — 84100 ASSAY OF PHOSPHORUS: CPT

## 2019-08-06 PROCEDURE — 71045 X-RAY EXAM CHEST 1 VIEW: CPT | Mod: 26

## 2019-08-06 PROCEDURE — 93306 TTE W/DOPPLER COMPLETE: CPT

## 2019-08-06 PROCEDURE — 71045 X-RAY EXAM CHEST 1 VIEW: CPT

## 2019-08-06 PROCEDURE — 85610 PROTHROMBIN TIME: CPT

## 2019-08-06 PROCEDURE — 36415 COLL VENOUS BLD VENIPUNCTURE: CPT

## 2019-08-06 PROCEDURE — 85027 COMPLETE CBC AUTOMATED: CPT

## 2019-08-06 PROCEDURE — 82607 VITAMIN B-12: CPT

## 2019-08-06 PROCEDURE — 81001 URINALYSIS AUTO W/SCOPE: CPT

## 2019-08-06 PROCEDURE — 99222 1ST HOSP IP/OBS MODERATE 55: CPT

## 2019-08-06 PROCEDURE — 70450 CT HEAD/BRAIN W/O DYE: CPT | Mod: 26

## 2019-08-06 PROCEDURE — C1894: CPT

## 2019-08-06 PROCEDURE — C1785: CPT

## 2019-08-06 PROCEDURE — C1898: CPT

## 2019-08-06 PROCEDURE — 93880 EXTRACRANIAL BILAT STUDY: CPT | Mod: 26

## 2019-08-06 PROCEDURE — 85025 COMPLETE CBC W/AUTO DIFF WBC: CPT

## 2019-08-06 PROCEDURE — 83036 HEMOGLOBIN GLYCOSYLATED A1C: CPT

## 2019-08-06 PROCEDURE — 84484 ASSAY OF TROPONIN QUANT: CPT

## 2019-08-06 PROCEDURE — 84443 ASSAY THYROID STIM HORMONE: CPT

## 2019-08-06 RX ORDER — CHOLECALCIFEROL (VITAMIN D3) 125 MCG
1000 CAPSULE ORAL DAILY
Refills: 0 | Status: DISCONTINUED | OUTPATIENT
Start: 2019-08-06 | End: 2019-08-08

## 2019-08-06 RX ORDER — FINASTERIDE 5 MG/1
5 TABLET, FILM COATED ORAL
Refills: 0 | Status: DISCONTINUED | OUTPATIENT
Start: 2019-08-06 | End: 2019-08-08

## 2019-08-06 RX ORDER — SODIUM CHLORIDE 9 MG/ML
3 INJECTION INTRAMUSCULAR; INTRAVENOUS; SUBCUTANEOUS ONCE
Refills: 0 | Status: COMPLETED | OUTPATIENT
Start: 2019-08-06 | End: 2019-08-06

## 2019-08-06 RX ORDER — FINASTERIDE 5 MG/1
1 TABLET, FILM COATED ORAL
Qty: 0 | Refills: 0 | DISCHARGE

## 2019-08-06 RX ADMIN — SODIUM CHLORIDE 3 MILLILITER(S): 9 INJECTION INTRAMUSCULAR; INTRAVENOUS; SUBCUTANEOUS at 17:55

## 2019-08-06 NOTE — ED STATDOCS - PROGRESS NOTE DETAILS
Hamilton MURPHY for Dr. Wagner: 89 y/o male with a PMHx of Osteoarthritis presents to the ED c/o dizziness. Sent by MD. States room spin while ambulating. No trauma. Denies SOB. Heart rate of 48, irregular rhythm. Pt states his heart rate is normally low. Will send pt to main ED for further evaluation.

## 2019-08-06 NOTE — H&P ADULT - NSICDXPASTSURGICALHX_GEN_ALL_CORE_FT
PAST SURGICAL HISTORY:  History of cholecystectomy 1955    History of lumbar laminectomy 2015    S/P colonoscopy 2014    S/P partial gastrectomy 2015    S/P tonsillectomy as a child

## 2019-08-06 NOTE — ED PROVIDER NOTE - OBJECTIVE STATEMENT
87 y/o female with PMHx of colitis, gastric tumor, OA, squamous cell carcinoma, s/p cholecystectomy, s/p partial gastrectomy presents to the ED c/o dizziness since yesterday. +SOB when walking up stairs. Dizziness is described as room spinning. Denies LOC, fall. This AM, pt was able to ambulate and walk down stairs without difficulty. Denies chest pain, lower extremities edema. Cardio: Sokal.

## 2019-08-06 NOTE — PATIENT PROFILE ADULT - SURGICAL SITE INCISION
Progress Note - Neurosurgery   Jose Guadalupe Kerr 76 y o  male MRN: [id number]  Unit/Bed#: [facility] 914-01 Encounter: [id number]    no

## 2019-08-06 NOTE — ED PROVIDER NOTE - PROGRESS NOTE DETAILS
Hamilton RANDALL for ED attending, Dr. Perez: Spoke with Dr. Black who requests Dr. Romano to see pt. Already spoke to Dr. Romano.

## 2019-08-06 NOTE — H&P ADULT - NSICDXPASTMEDICALHX_GEN_ALL_CORE_FT
PAST MEDICAL HISTORY:  Colitis     Gastric tumor 2015 GIST    Hard of hearing b/l hearing aid    Osteoarthritis right knee    Squamous cell cancer of skin of hand

## 2019-08-06 NOTE — H&P ADULT - NEUROLOGICAL DETAILS
responds to verbal commands/sensation intact/cranial nerves intact/alert and oriented x 3/responds to pain/normal strength

## 2019-08-06 NOTE — H&P ADULT - ASSESSMENT
87 yo male admitted for acute onset vertigo concerning for central lesion, also found to have bradycardia with new 1st degree AV block and worsening conduction disease.     1) Vertigo, non intractable, acute onset  -concerning for cardiological/neurological etiology  -Cardiology, EP, and Neurology consulted  -Appreciate cardiology and EP evaluation, recs implemented  -CT head neg for acute pathology  -Pt refusing MRI/MRA/CTA due to 'severe anxiety/claustrophobia' despite explanation of risks vs benefit as central lesion/CVA cannot be excluded at this time and is suspicious given presentation and personal/family h/o stomach+brain CA. Would use caution with sedation given bradycardia.  -carotid US, TTE ordered  -Neurochecks q 4 hrs  -NIH 0 at this time-pt asymptomatic  -fall and aspiration precautions  -hold ASA and chemical DVT prophylaxis for procedure in AM-PPM  - VCD boots for DVT proph.  -f/u AM cbc, bmp, mg, phos, coags  - f/u U/A  -f/u lipid panel and HbA1c  -hold statin, pt reports h/o 'low low cholesterol'    2) Bradycardia  -EKG reviewed  -trend troponin  -Pacer pads placed    3) BPH s/p TURP  -cont finasteride every other day    4) Elevated blood pressure without h/o HTN  -pt states bp is usually low, 100/70's  -hold antiHTN at this time, pt has high anxiety and bp trending down w/o medication, in addition, as cva cannot be r/o would allow for permissive HTN

## 2019-08-06 NOTE — ED PROVIDER NOTE - CLINICAL SUMMARY MEDICAL DECISION MAKING FREE TEXT BOX
88M w/ dizziness, mildly bradycardiac hr, will check labs, d/w cards, reassess 88M w/ dizziness, mildly bradycardiac hr, will check labs, d/w cards, reassess    Pt with sinus bradycardia, with symptomatic yesterday with vertigo.  CT head negative here.  Asymptomatic in ED.  Labs and CXR otherwise unremarkable.  Cardiology consult in ED.  Admit to tele, EP study in AM.

## 2019-08-06 NOTE — ED ADULT NURSE NOTE - NSIMPLEMENTINTERV_GEN_ALL_ED
Implemented All Fall Risk Interventions:  [place] to call system. Call bell, personal items and telephone within reach. Instruct patient to call for assistance. Room bathroom lighting operational. Non-slip footwear when patient is off stretcher. Physically safe environment: no spills, clutter or unnecessary equipment. Stretcher in lowest position, wheels locked, appropriate side rails in place. Provide visual cue, wrist band, yellow gown, etc. Monitor gait and stability. Monitor for mental status changes and reorient to person, place, and time. Review medications for side effects contributing to fall risk. Reinforce activity limits and safety measures with patient and family.

## 2019-08-06 NOTE — ED ADULT TRIAGE NOTE - CHIEF COMPLAINT QUOTE
Pt WI states sent by MD for dizziness for two days, states room spins while ambulating. denies trauma. No visual deficits, Able to ambulate with steady gait. Denies chest pain, shortness of breath.

## 2019-08-06 NOTE — ED ADULT NURSE NOTE - OBJECTIVE STATEMENT
Pt presents to ED for dizziness X 2 days. Pt went to see PMD this afternoon for dizziness, and HARPER. Pt denies any recent falls. PMD sent pt to ED for bradycardia. Pt denies any cp, sob. Pt states symptoms have subsided since yesterday

## 2019-08-06 NOTE — ED ADULT TRIAGE NOTE - BSA (M2)
Report received. Care assumed. Resting in bed. Alert and oriented. Respirations even and unlabored. No c/o. Call light in reach.    2

## 2019-08-06 NOTE — H&P ADULT - ATTENDING COMMENTS
18 min spent discussing advanced care planning and pt is full code at this time. Pt states his proxy is his wife Marina and his daughter Bautista may also assist in decision making. Pt has expressed his wishes to allow CPR and intubation to his family.

## 2019-08-06 NOTE — H&P ADULT - HISTORY OF PRESENT ILLNESS
Pt is an 89 yo male with a pmh/o stomach cancer s/p partial gastrectomy, bph s/p turp, whose baseline bp is 100/70's and HR in 40's for approx. 6-10 yrs, who p/w sensation as if the room is spinning vertically resulting in inability to ambulate. Pt states he woke up on Monday morning and upon standing up from bed, immediately started having spinning sensation, describes it as if he was a ball rolling down a hill head first. Pt states he then laid down and symptoms persisted all night. Pt states when turning his head side to side that the vertical spinning recurs. At rest pt sx resolves. Pt awoke Tue morning and sx were gone however he states he has been 'out of it' and attributes this sensation to his anxiety over the situation. Pt then saw PMD who performed EKG and sent pt to ED.    Of note, pt has been "back and forth" about needing a pace maker for some time. Pt has also been told he needs a MRI of his brain however after several attempts, he states that it was never performed because of severe anxiety. Pt states that when he had those incomplete studies, his stomach tumor was found. Pt describes one previous episode of vertigo when standing up to get in a boat. He also described that episode of vertically falling. Of note, pt father succumbed to complications of a brain tumor at 61 yrs old.     Pt denies chest pain, palpitations, n/v/d, fever, cough, leg pain, swelling, HA, h/o migraines, AMS, confusion, LOC, falling, numbness, tingling, loss of motor function, visual or auditory changes.    Pt endorses gait instability and diaphoresis and anxiety.

## 2019-08-07 LAB
ANION GAP SERPL CALC-SCNC: 5 MMOL/L — SIGNIFICANT CHANGE UP (ref 5–17)
APPEARANCE UR: CLEAR — SIGNIFICANT CHANGE UP
APTT BLD: 32.1 SEC — SIGNIFICANT CHANGE UP (ref 27.5–36.3)
BILIRUB UR-MCNC: NEGATIVE — SIGNIFICANT CHANGE UP
BUN SERPL-MCNC: 18 MG/DL — SIGNIFICANT CHANGE UP (ref 7–23)
CALCIUM SERPL-MCNC: 8.9 MG/DL — SIGNIFICANT CHANGE UP (ref 8.5–10.1)
CHLORIDE SERPL-SCNC: 113 MMOL/L — HIGH (ref 96–108)
CHOLEST SERPL-MCNC: 137 MG/DL — SIGNIFICANT CHANGE UP (ref 10–199)
CO2 SERPL-SCNC: 28 MMOL/L — SIGNIFICANT CHANGE UP (ref 22–31)
COLOR SPEC: YELLOW — SIGNIFICANT CHANGE UP
CREAT SERPL-MCNC: 0.85 MG/DL — SIGNIFICANT CHANGE UP (ref 0.5–1.3)
DIFF PNL FLD: ABNORMAL
GLUCOSE SERPL-MCNC: 100 MG/DL — HIGH (ref 70–99)
GLUCOSE UR QL: NEGATIVE MG/DL — SIGNIFICANT CHANGE UP
HBA1C BLD-MCNC: 5.4 % — SIGNIFICANT CHANGE UP (ref 4–5.6)
HCT VFR BLD CALC: 44.3 % — SIGNIFICANT CHANGE UP (ref 39–50)
HDLC SERPL-MCNC: 41 MG/DL — SIGNIFICANT CHANGE UP
HGB BLD-MCNC: 14.9 G/DL — SIGNIFICANT CHANGE UP (ref 13–17)
INR BLD: 1.04 RATIO — SIGNIFICANT CHANGE UP (ref 0.88–1.16)
KETONES UR-MCNC: NEGATIVE — SIGNIFICANT CHANGE UP
LEUKOCYTE ESTERASE UR-ACNC: ABNORMAL
LIPID PNL WITH DIRECT LDL SERPL: 85 MG/DL — SIGNIFICANT CHANGE UP
MAGNESIUM SERPL-MCNC: 2.3 MG/DL — SIGNIFICANT CHANGE UP (ref 1.6–2.6)
MCHC RBC-ENTMCNC: 32.2 PG — SIGNIFICANT CHANGE UP (ref 27–34)
MCHC RBC-ENTMCNC: 33.6 GM/DL — SIGNIFICANT CHANGE UP (ref 32–36)
MCV RBC AUTO: 95.7 FL — SIGNIFICANT CHANGE UP (ref 80–100)
NITRITE UR-MCNC: NEGATIVE — SIGNIFICANT CHANGE UP
PH UR: 5 — SIGNIFICANT CHANGE UP (ref 5–8)
PHOSPHATE SERPL-MCNC: 3.1 MG/DL — SIGNIFICANT CHANGE UP (ref 2.5–4.5)
PLATELET # BLD AUTO: 194 K/UL — SIGNIFICANT CHANGE UP (ref 150–400)
POTASSIUM SERPL-MCNC: 4 MMOL/L — SIGNIFICANT CHANGE UP (ref 3.5–5.3)
POTASSIUM SERPL-SCNC: 4 MMOL/L — SIGNIFICANT CHANGE UP (ref 3.5–5.3)
PROT UR-MCNC: 30 MG/DL
PROTHROM AB SERPL-ACNC: 11.6 SEC — SIGNIFICANT CHANGE UP (ref 10–12.9)
RBC # BLD: 4.63 M/UL — SIGNIFICANT CHANGE UP (ref 4.2–5.8)
RBC # FLD: 13.3 % — SIGNIFICANT CHANGE UP (ref 10.3–14.5)
SODIUM SERPL-SCNC: 146 MMOL/L — HIGH (ref 135–145)
SP GR SPEC: 1.02 — SIGNIFICANT CHANGE UP (ref 1.01–1.02)
TOTAL CHOLESTEROL/HDL RATIO MEASUREMENT: 3.3 RATIO — LOW (ref 3.4–9.6)
TRIGL SERPL-MCNC: 54 MG/DL — SIGNIFICANT CHANGE UP (ref 10–149)
TROPONIN I SERPL-MCNC: <0.015 NG/ML — SIGNIFICANT CHANGE UP (ref 0.01–0.04)
TSH SERPL-MCNC: 2.29 UU/ML — SIGNIFICANT CHANGE UP (ref 0.34–4.82)
UROBILINOGEN FLD QL: NEGATIVE MG/DL — SIGNIFICANT CHANGE UP
VIT B12 SERPL-MCNC: 731 PG/ML — SIGNIFICANT CHANGE UP (ref 232–1245)
WBC # BLD: 7.28 K/UL — SIGNIFICANT CHANGE UP (ref 3.8–10.5)
WBC # FLD AUTO: 7.28 K/UL — SIGNIFICANT CHANGE UP (ref 3.8–10.5)

## 2019-08-07 PROCEDURE — 71045 X-RAY EXAM CHEST 1 VIEW: CPT | Mod: 26

## 2019-08-07 PROCEDURE — 99223 1ST HOSP IP/OBS HIGH 75: CPT

## 2019-08-07 PROCEDURE — 93306 TTE W/DOPPLER COMPLETE: CPT | Mod: 26

## 2019-08-07 PROCEDURE — 33208 INSRT HEART PM ATRIAL & VENT: CPT | Mod: KX

## 2019-08-07 PROCEDURE — 93010 ELECTROCARDIOGRAM REPORT: CPT

## 2019-08-07 RX ORDER — CEFAZOLIN SODIUM 1 G
2000 VIAL (EA) INJECTION ONCE
Refills: 0 | Status: COMPLETED | OUTPATIENT
Start: 2019-08-07 | End: 2019-08-07

## 2019-08-07 RX ORDER — CEFAZOLIN SODIUM 1 G
1000 VIAL (EA) INJECTION EVERY 8 HOURS
Refills: 0 | Status: COMPLETED | OUTPATIENT
Start: 2019-08-07 | End: 2019-08-08

## 2019-08-07 RX ORDER — AMLODIPINE BESYLATE 2.5 MG/1
5 TABLET ORAL ONCE
Refills: 0 | Status: COMPLETED | OUTPATIENT
Start: 2019-08-07 | End: 2019-08-07

## 2019-08-07 RX ADMIN — Medication 100 MILLIGRAM(S): at 15:48

## 2019-08-07 RX ADMIN — Medication 1000 MILLIGRAM(S): at 23:18

## 2019-08-07 RX ADMIN — AMLODIPINE BESYLATE 5 MILLIGRAM(S): 2.5 TABLET ORAL at 19:40

## 2019-08-07 NOTE — CONSULT NOTE ADULT - ASSESSMENT
ASSESSMENT & PLAN:  88y Male with BPH, overall has good functional status, plays golf, is admitted for fatigue and dizziness which is likely neurologic in nature, pending CT head to rule out CVA. Patient is demonstrating significant symptomatic sinus node disease with poor chronotropic response, also EKG shows worsening conduction disease baseline RBBB with new 1st degree AVB. It is reasonable for patient to have dual chamber PPM in the setting of symptomatic sinus node dysfunction  -send TSH  -follow with CT head and Neurology  -plan for PPM implant tomorrow if no contraindication   -npo after midnight     Thank-you for letting the EP Service  participate in the care of your patient.
ImP:  1. dizziness of uncertain etiology,. possible tia or bpv. seems unlikely to be due to bradycardia  2. Sinus bradycardia with rbbb and prolonged pr interval, near syncope    Plan: admit to tele for monitoring  ep eval. ? ep study to determine need for ppm  ct scan head, neg  will follow
Pt is an 87 yo male with a pmh/o stomach cancer s/p partial gastrectomy, bph s/p turp, whose baseline bp is 100/70's and HR in 40's for approx. 6-10 yrs, who p/w sensation as if the room is spinning vertically resulting in inability to ambulate.     Acute Vertigo with persistent Unsteady gait    Need to R/o CNS pathology    Pt understands need for MRI scan.    Scheduled for PPM today.    Can get MRI compatible PPM and get MRI with sedation.    CT and carotid doppler neg for acute pathology.    Pt still needs MRI brain with MRA brain/ carotids.    Will f/u.    Discussed with Pt.

## 2019-08-07 NOTE — CONSULT NOTE ADULT - SUBJECTIVE AND OBJECTIVE BOX
HPI:  88y Male who overall has good functional status, plays golf, takes only one medication finasteride for BPH, is known to have EKG abnormality with RBBB and follows with Cardiology Dr. Patel. Patient presented to ED for episode of dizziness, described as "room spinning around". He also has exertional dyspnea and fatigue for 2 days. He denies syncope. Telemetry review shows sinus bradycardia to 40-50s bpm. Supine exertion for 5 min, HR improved to only 65 bpm while he was getting fatigued. Denies rash, chest pain, extremity weakness, fever.    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  Osteoarthritis: right knee  Colitis  Gastric tumor: 2015 [facility]  Hard of hearing: b/l hearing aid  S/P colonoscopy: 2014  History of cholecystectomy: 1955  S/P partial gastrectomy: 2015  S/P tonsillectomy: as a child  History of lumbar laminectomy: 2015    Allergies    aspirin (Unknown)  codeine (Nausea)  Originally Entered as [Unknown] reaction to [mono sodium glutinate] (Unknown)      SOCIAL HISTORY: Denies tobacco, etoh abuse or illicit drug use    FAMILY HISTORY:    Vital Signs Last 24 Hrs  T(C): 36.6 (06 Aug 2019 15:18), Max: 36.6 (06 Aug 2019 13:46)  T(F): 97.9 (06 Aug 2019 15:18), Max: 97.9 (06 Aug 2019 15:18)  HR: 49 (06 Aug 2019 15:18) (49 - 62)  BP: 144/71 (06 Aug 2019 15:18) (144/71 - 160/81)  BP(mean): --  RR: 16 (06 Aug 2019 15:18) (16 - 18)  SpO2: 100% (06 Aug 2019 15:18) (100% - 100%)    REVIEW OF SYSTEMS:    CONSTITUTIONAL:  As per HPI.  HEENT:  Eyes:  No diplopia or blurred vision. ENT:  No earache, sore throat or runny nose.  CARDIOVASCULAR:  fatigue and dyspnea,No pressure, squeezing, strangling, tightness, heaviness or aching about the chest, neck, axilla or epigastrium.  RESPIRATORY:  No cough, shortness of breath, PND or orthopnea.  GASTROINTESTINAL:  No nausea, vomiting or diarrhea.  GENITOURINARY:  No dysuria, frequency or urgency.  MUSCULOSKELETAL:  As per HPI.  SKIN:  No change in skin, hair or nails.  NEUROLOGIC:  No paresthesias, fasciculations, seizures or weakness.  PSYCHIATRIC:  No disorder of thought or mood.  ENDOCRINE:  No heat or cold intolerance, polyuria or polydipsia.  HEMATOLOGICAL:  No easy bruising or bleedings:  .     PHYSICAL EXAMINATION:    GENERAL APPEARANCE:  Pt. is not currently dyspneic, in no distress. Pt. is alert, oriented, and pleasant.  HEENT:  Pupils are normal and react normally. No icterus. Mucous membranes well colored.  NECK:  Supple. No lymphadenopathy. Jugular venous pressure not elevated. Carotids equal.   HEART:   The cardiac impulse has a normal quality. There are no murmurs, rubs or gallops noted  CHEST:  Chest is clear to auscultation. Normal respiratory effort.  ABDOMEN:  Soft and nontender.   EXTREMITIES:  There is no edema.   SKIN:  No rash or significant lesions are noted.      LABS:                        16.3   7.91  )-----------( 214      ( 06 Aug 2019 14:53 )             48.9     08-06    143  |  109<H>  |  15  ----------------------------<  95  3.9   |  28  |  0.95    Ca    9.2      06 Aug 2019 14:53  Mg     2.3     08-06    TPro  7.2  /  Alb  3.8  /  TBili  0.5  /  DBili  x   /  AST  18  /  ALT  20  /  AlkPhos  69  08-06    LIVER FUNCTIONS - ( 06 Aug 2019 14:53 )  Alb: 3.8 g/dL / Pro: 7.2 gm/dL / ALK PHOS: 69 U/L / ALT: 20 U/L / AST: 18 U/L / GGT: x           PT/INR - ( 06 Aug 2019 14:53 )   PT: 11.4 sec;   INR: 1.03 ratio         PTT - ( 06 Aug 2019 14:53 )  PTT:33.7 sec  CARDIAC MARKERS ( 06 Aug 2019 14:53 )  <0.015 ng/mL / x     / x     / x     / x                EKG: SR, first degree AVB (NH 220ms), RBBB (old), old TWI inferior leads    TELEMETRY: HR 40-50s bpm, poor chronotropic response to exercise
Patient is a 88y old  Male who presents with a chief complaint of Bradycardia, Vertigo .      HPI:  Pt is an 89 yo male with PMH of  stomach cancer s/p partial gastrectomy, BPH, s/p TIRP, whose baseline BP is 100/70's and HR in 40's for approx. 6-10 yrs, who p/w Dizziness since Monday with inability to ambulate. Pt states he woke up on Monday morning and upon standing up from bed, immediately started having spinning sensation, describes it as if he was a ball rolling down a hill head first. Pt states he then laid down and symptoms persisted all night. Pt states when turning his head side to side that the vertical spinning recurs. At rest pt sx resolves. Pt awoke Tue morning and sx were gone however he states he has been 'out of it' and attributes this sensation to his anxiety over the situation. Pt then saw his PCP who performed EKG and sent pt to ED.    Pt was under care of Cardiologist and recommended PPM but undecided till now.  Pt has also been told he needs a MRI of his brain but due to claustrophobia did not do. He states that it was never performed because of severe anxiety. Pt describes one previous episode of vertigo when standing up to get in a boat several years ago. He also described that episode of vertically falling.  Pt father succumbed to complications of a brain tumor at 61 yrs old. No associated N/V, focal weakness or numbness. But unsteady gait when he ambulates.      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  Osteoarthritis: right knee  Colitis  Gastric tumor: 2015 [facility]  Hard of hearing: b/l hearing aid  S/P colonoscopy:   History of cholecystectomy:   S/P partial gastrectomy: 2015  S/P tonsillectomy: as a child  History of lumbar laminectomy:       FAMILY HISTORY:  FH: Parkinson's disease: mother,   FHx: brain tumor: in father,       Social Hx:  Nonsmoker, no drug or alcohol use    MEDICATIONS  (STANDING):  cholecalciferol 1000 Unit(s) Oral daily  finasteride 5 milliGRAM(s) Oral <User Schedule>  multivitamin 1 Tablet(s) Oral daily       Allergies    aspirin (Unknown)  codeine (Nausea)  Originally Entered as [Unknown] reaction to [mono sodium glutinate] (Unknown)      ROS: Pertinent positives in HPI, all other ROS were reviewed and are negative.      Vital Signs Last 24 Hrs  T(C): 36.6 (07 Aug 2019 05:10), Max: 36.9 (06 Aug 2019 19:06)  T(F): 97.8 (07 Aug 2019 05:10), Max: 98.4 (06 Aug 2019 19:06)  HR: 53 (07 Aug 2019 05:10) (49 - 62)  BP: 135/71 (07 Aug 2019 05:10) (135/71 - 179/83)  BP(mean): --  RR: 18 (06 Aug 2019 21:00) (16 - 18)  SpO2: 98% (07 Aug 2019 05:10) (98% - 100%)        Constitutional: awake and alert.  HEENT: PERRLA, EOMI,   Neck: Supple.  Respiratory: Breath sounds are clear bilaterally  Cardiovascular: S1 and S2, regular  rhythm  Gastrointestinal: soft, nontender  Extremities:  no edema  Vascular:  Carotid Bruit - no  Musculoskeletal: no joint swelling/tenderness, no abnormal movements  Skin: No rashes    Neurological exam:  HF: A x O x 3. Appropriately interactive, normal affect. Speech fluent, No Aphasia or paraphasic errors.   CN: DEYANIRA, EOMI, VFF, no nystagmus, facial sensation normal, no NLFD, tongue midline, Palate moves equally   Motor: No pronator drift, Strength 5/5 in all 4 ext, normal bulk and tone    Sens: Intact to light touch    Reflexes: Symmetric and normal . BJ 2+, BR 2+, KJ 2+, AJ 1+, downgoing toes b/l  Coord:  No FNFA, dysmetria, KAMRAN intact   Gait/Balance: Cannot test    NIHSS: 0      Labs:       143  |  109<H>  |  15  ----------------------------<  95  3.9   |  28  |  0.95    Ca    9.2      06 Aug 2019 14:53  Mg     2.3         TPro  7.2  /  Alb  3.8  /  TBili  0.5  /  DBili  x   /  AST  18  /  ALT  20  /  AlkPhos  69                                14.9   7.28  )-----------( 194      ( 07 Aug 2019 07:30 )             44.3       Radiology:  - CT Head:    < from: CT Head No Cont (19 @ 15:50) >  IMPRESSION:    No acute intracranial findings.      < from: US Duplex Carotid Arteries Complete, Bilateral (19 @ 19:02) >  IMPRESSION:      No hemodynamic significant stenosis bilaterally.
Patient is a 88y old  Male who presents with a chief complaint of dizziness    Cardiology: 88 year old male with known chronic sinus bradycardia and RBBB seen in office today with 24 hours dizziness and vertigo. Mostly occurs with movement of head. Had near syncope but no syncope. no chest pain or dyspnea. more prolonged pr interval also noted on ecg.       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  Constitutional  Eyes:    Ears, nose, mouth,   throat, and face:    Neck:   Respiratory:   and wheezing  Cardiovascular:    Gastrointestinal:  Genitourinary:     Hematologic/lymphatic:   Musculoskeletal:   Neurological:   Behavioral/Psych:        Vital Signs Last 24 Hrs  T(C): 36.6 (06 Aug 2019 15:18), Max: 36.6 (06 Aug 2019 13:46)  T(F): 97.9 (06 Aug 2019 15:18), Max: 97.9 (06 Aug 2019 15:18)  HR: 49 (06 Aug 2019 15:18) (49 - 62)  BP: 144/71 (06 Aug 2019 15:18) (144/71 - 160/81)  BP(mean): --  RR: 16 (06 Aug 2019 15:18) (16 - 18)  SpO2: 100% (06 Aug 2019 15:18) (100% - 100%)    I&O's Summary    PHYSICAL EXAM  General Appearance: cooperative, no acute distress,   HEENT: PERRL, conjunctiva clear, EOM's intact,    Neck: Supple, , no adenopathy, thyroid: not enlarged, no carotid bruit or JVD  Back: Symmetric, no  tenderness,no soft tissue tenderness  Lungs: Clear to auscultation bilaterally,no adventitious breath sounds, normal   expiratory phase  Heart: Regular rate and rhythm, S1, S2 normal, 2/6 sandi base   Abdomen: Soft, non-tender, bowel sounds active , no hepatosplenomegaly  Extremities: no cyanosis or edema, no joint swelling  Skin: Skin color, texture normal, no rashes   Neurologic: Alert and oriented X3 ,         INTERPRETATION OF TELEMETRY:    ECG:        LABS:                          16.3   7.91  )-----------( 214      ( 06 Aug 2019 14:53 )             48.9     08-06    143  |  109<H>  |  15  ----------------------------<  95  3.9   |  28  |  0.95    Ca    9.2      06 Aug 2019 14:53  Mg     2.3     08-06    TPro  7.2  /  Alb  3.8  /  TBili  0.5  /  DBili  x   /  AST  18  /  ALT  20  /  AlkPhos  69  08-06    CARDIAC MARKERS ( 06 Aug 2019 14:53 )  <0.015 ng/mL / x     / x     / x     / x              PT/INR - ( 06 Aug 2019 14:53 )   PT: 11.4 sec;   INR: 1.03 ratio         PTT - ( 06 Aug 2019 14:53 )  PTT:33.7 sec          RADIOLOGY & ADDITIONAL STUDIES:    IMPRESSION:    PLAN:

## 2019-08-07 NOTE — PACU DISCHARGE NOTE - COMMENTS
Transfer to . Placed on telemetry. Telemetry transmission verified withGerson Lane. Report given to Eneida VALENCIA.

## 2019-08-08 ENCOUNTER — TRANSCRIPTION ENCOUNTER (OUTPATIENT)
Age: 84
End: 2019-08-08

## 2019-08-08 VITALS
DIASTOLIC BLOOD PRESSURE: 65 MMHG | OXYGEN SATURATION: 99 % | RESPIRATION RATE: 18 BRPM | HEART RATE: 62 BPM | SYSTOLIC BLOOD PRESSURE: 132 MMHG | TEMPERATURE: 97 F

## 2019-08-08 LAB
ANION GAP SERPL CALC-SCNC: 6 MMOL/L — SIGNIFICANT CHANGE UP (ref 5–17)
BASOPHILS # BLD AUTO: 0.03 K/UL — SIGNIFICANT CHANGE UP (ref 0–0.2)
BASOPHILS NFR BLD AUTO: 0.3 % — SIGNIFICANT CHANGE UP (ref 0–2)
BUN SERPL-MCNC: 14 MG/DL — SIGNIFICANT CHANGE UP (ref 7–23)
CALCIUM SERPL-MCNC: 8.3 MG/DL — LOW (ref 8.5–10.1)
CHLORIDE SERPL-SCNC: 110 MMOL/L — HIGH (ref 96–108)
CO2 SERPL-SCNC: 26 MMOL/L — SIGNIFICANT CHANGE UP (ref 22–31)
CREAT SERPL-MCNC: 0.72 MG/DL — SIGNIFICANT CHANGE UP (ref 0.5–1.3)
EOSINOPHIL # BLD AUTO: 0.17 K/UL — SIGNIFICANT CHANGE UP (ref 0–0.5)
EOSINOPHIL NFR BLD AUTO: 1.8 % — SIGNIFICANT CHANGE UP (ref 0–6)
GLUCOSE SERPL-MCNC: 100 MG/DL — HIGH (ref 70–99)
HCT VFR BLD CALC: 44.4 % — SIGNIFICANT CHANGE UP (ref 39–50)
HGB BLD-MCNC: 15.2 G/DL — SIGNIFICANT CHANGE UP (ref 13–17)
IMM GRANULOCYTES NFR BLD AUTO: 0.2 % — SIGNIFICANT CHANGE UP (ref 0–1.5)
LYMPHOCYTES # BLD AUTO: 1.51 K/UL — SIGNIFICANT CHANGE UP (ref 1–3.3)
LYMPHOCYTES # BLD AUTO: 16.1 % — SIGNIFICANT CHANGE UP (ref 13–44)
MAGNESIUM SERPL-MCNC: 2 MG/DL — SIGNIFICANT CHANGE UP (ref 1.6–2.6)
MCHC RBC-ENTMCNC: 32.5 PG — SIGNIFICANT CHANGE UP (ref 27–34)
MCHC RBC-ENTMCNC: 34.2 GM/DL — SIGNIFICANT CHANGE UP (ref 32–36)
MCV RBC AUTO: 95.1 FL — SIGNIFICANT CHANGE UP (ref 80–100)
MONOCYTES # BLD AUTO: 0.71 K/UL — SIGNIFICANT CHANGE UP (ref 0–0.9)
MONOCYTES NFR BLD AUTO: 7.6 % — SIGNIFICANT CHANGE UP (ref 2–14)
NEUTROPHILS # BLD AUTO: 6.93 K/UL — SIGNIFICANT CHANGE UP (ref 1.8–7.4)
NEUTROPHILS NFR BLD AUTO: 74 % — SIGNIFICANT CHANGE UP (ref 43–77)
PLATELET # BLD AUTO: 191 K/UL — SIGNIFICANT CHANGE UP (ref 150–400)
POTASSIUM SERPL-MCNC: 3.6 MMOL/L — SIGNIFICANT CHANGE UP (ref 3.5–5.3)
POTASSIUM SERPL-SCNC: 3.6 MMOL/L — SIGNIFICANT CHANGE UP (ref 3.5–5.3)
RBC # BLD: 4.67 M/UL — SIGNIFICANT CHANGE UP (ref 4.2–5.8)
RBC # FLD: 13.2 % — SIGNIFICANT CHANGE UP (ref 10.3–14.5)
SODIUM SERPL-SCNC: 142 MMOL/L — SIGNIFICANT CHANGE UP (ref 135–145)
WBC # BLD: 9.37 K/UL — SIGNIFICANT CHANGE UP (ref 3.8–10.5)
WBC # FLD AUTO: 9.37 K/UL — SIGNIFICANT CHANGE UP (ref 3.8–10.5)

## 2019-08-08 PROCEDURE — 99233 SBSQ HOSP IP/OBS HIGH 50: CPT

## 2019-08-08 PROCEDURE — 93010 ELECTROCARDIOGRAM REPORT: CPT

## 2019-08-08 RX ORDER — ACETAMINOPHEN 500 MG
650 TABLET ORAL ONCE
Refills: 0 | Status: COMPLETED | OUTPATIENT
Start: 2019-08-08 | End: 2019-08-08

## 2019-08-08 RX ORDER — AMLODIPINE BESYLATE 2.5 MG/1
1 TABLET ORAL
Qty: 30 | Refills: 0
Start: 2019-08-08 | End: 2019-09-06

## 2019-08-08 RX ADMIN — Medication 1 TABLET(S): at 11:54

## 2019-08-08 RX ADMIN — Medication 1000 MILLIGRAM(S): at 06:05

## 2019-08-08 RX ADMIN — Medication 1000 UNIT(S): at 11:56

## 2019-08-08 RX ADMIN — Medication 650 MILLIGRAM(S): at 06:28

## 2019-08-08 NOTE — DISCHARGE NOTE PROVIDER - NSDCCPCAREPLAN_GEN_ALL_CORE_FT
PRINCIPAL DISCHARGE DIAGNOSIS  Diagnosis: SOB (shortness of breath)  Assessment and Plan of Treatment:         SECONDARY DISCHARGE DIAGNOSES  Diagnosis: Lightheadedness  Assessment and Plan of Treatment:

## 2019-08-08 NOTE — PROGRESS NOTE ADULT - REASON FOR ADMISSION
Bradycardia, Vertigo

## 2019-08-08 NOTE — DISCHARGE NOTE PROVIDER - NSDCFUSCHEDAPPT_GEN_ALL_CORE_FT
EVELYNE HAYES ; 08/22/2019 ; [facility] [address] EVELYNE Echeverria ; 10/23/2019 ; [facility] [address]

## 2019-08-08 NOTE — DISCHARGE NOTE PROVIDER - HOSPITAL COURSE
Patient is a 88y old  Male who presents with a chief complaint of Bradycardia, Vertigo (08 Aug 2019 12:12)    s/p PPM on 8/7/19 and states he feels better after the PPM and he does not want to get the MRI or MRA brain done here at this time. Further he states that his claustrophobia is severe and he does not want to get an MRI anytime soon regardless.    States feeling better overall. Will followup with PMD Card and Neurology.            T(C): 36.3 (08-08-19 @ 11:30), Max: 36.7 (08-08-19 @ 05:36)    HR: 62 (08-08-19 @ 11:30) (52 - 62)    BP: 132/65 (08-08-19 @ 11:30) (132/65 - 181/76)    SpO2: 99% (08-08-19 @ 11:30) (95% - 100%)    Wt(kg): --    LABS:                            15.2     9.37  )-----------( 191      ( 08 Aug 2019 07:10 )               44.4         08-08        142  |  110<H>  |  14    ----------------------------<  100<H>    3.6   |  26  |  0.72        Ca    8.3<L>      08 Aug 2019 07:10    Phos  3.1     08-07    Mg     2.0     08-08        TPro  7.2  /  Alb  3.8  /  TBili  0.5  /  DBili  x   /  AST  18  /  ALT  20  /  AlkPhos  69  08-06        IMAGING reviewed by me:    from: CT Head No Cont (08.06.19 @ 15:50) >    There is no evidence of acute infarction, intracranial hemorrhage or mass     lesion.  There is hypoattenuation of the subcortical and periventricular     white matter, which is nonspecific finding, but most likely represents     sequela of chronic microvascular ischemic disease. There are     atherosclerotic calcifications of the cavernous and supraclinoid internal     carotid arteries bilaterally. There is prominence of the cortical sulci     related to underlying brain parenchymal volume loss.    There is no evidence of hydrocephalus. There are no extra-axial fluid     collections.    ECHO     Fibrocalcific changes noted to the Aortic valve leaflets with minimally     restricted leaflet excursion.     Moderate aortic regurgitation is present.     The left atrium is mildly dilated.     Mild asymmetrical septal left ventricular hypertrophy is present.     There are no signs of LVOT obstruction.     Estimated left ventricular ejection fraction is 60-65 %.            PHYSICAL EXAM:    GENERAL: awake, alert, oriented     HEAD:  Atraumatic, Normocephalic    EYES: EOMI, PERRLA, conjunctiva and sclera clear    ENMT: Moist mucous membranes    NECK: Supple, No JVD, thyroid nontender    NERVOUS SYSTEM:  Alert & Oriented X3, PERRL, no facial asymmetry, tongue midline, moving all extremities, no focal motor or sensory deficits    CHEST/LUNG: Clear to auscultation bilaterally; No rales, rhonchi, wheezing, or rubs, s/p PPM, area looks healthy    HEART: Regular rate and rhythm; No murmurs, rubs, or gallops    ABDOMEN: Soft, Nontender, Nondistended; Bowel sounds present    EXTREMITIES:  2+ Peripheral Pulses, No clubbing, cyanosis, or edema    MUSCULOSKELTAL- no joint swelling or erythema    SKIN-no rash, no lesion        cholecalciferol 1000 Unit(s) Oral daily    finasteride 5 milliGRAM(s) Oral <User Schedule>    multivitamin 1 Tablet(s) Oral daily        A/P    87 yo male admitted for acute onset vertigo concerning for central lesion, also found to have bradycardia with new 1st degree AV block and worsening conduction disease.     Vertigo/Dizziness    Sinus Node Dysfunction: Abnormal EKG with 1st degree AVB  and RBBB    h/o gastric tumor GIST in 2015    BPH        Plan:    s/p PPM    f/u TFTs - wnl    appreciate neuro consult    CT head neg for acute pathology    f/u MRI brain and MRA head and neck all as OP    ASA and VTE PX held for PPM today, will resume when cleared by Sx    - VCD boots for DVT proph.    -hold statin, pt reports h/o 'low low cholesterol'     BPH s/p TURP    -cont finasteride every other day        Elevated blood pressure without h/o HTN    -pt states bp is usually low, 100/70's    start low dose amlodipine        discussed with RN, Card and Neuro    DC time spent - 60 mins Patient is a 88y old  Male who presents with a chief complaint of Bradycardia, Vertigo (08 Aug 2019 12:12)    s/p PPM on 8/7/19 and states he feels better after the PPM and he does not want to get the MRI or MRA brain done here at this time. Further he states that his claustrophobia is severe and he does not want to get an MRI anytime soon regardless.    States feeling better overall. Will followup with PMD Card and Neurology.            T(C): 36.3 (08-08-19 @ 11:30), Max: 36.7 (08-08-19 @ 05:36)    HR: 62 (08-08-19 @ 11:30) (52 - 62)    BP: 132/65 (08-08-19 @ 11:30) (132/65 - 181/76)    SpO2: 99% (08-08-19 @ 11:30) (95% - 100%)    Wt(kg): --    LABS:                            15.2     9.37  )-----------( 191      ( 08 Aug 2019 07:10 )               44.4         08-08        142  |  110<H>  |  14    ----------------------------<  100<H>    3.6   |  26  |  0.72        Ca    8.3<L>      08 Aug 2019 07:10    Phos  3.1     08-07    Mg     2.0     08-08        TPro  7.2  /  Alb  3.8  /  TBili  0.5  /  DBili  x   /  AST  18  /  ALT  20  /  AlkPhos  69  08-06        IMAGING reviewed by me:    from: CT Head No Cont (08.06.19 @ 15:50) >    There is no evidence of acute infarction, intracranial hemorrhage or mass     lesion.  There is hypoattenuation of the subcortical and periventricular     white matter, which is nonspecific finding, but most likely represents     sequela of chronic microvascular ischemic disease. There are     atherosclerotic calcifications of the cavernous and supraclinoid internal     carotid arteries bilaterally. There is prominence of the cortical sulci     related to underlying brain parenchymal volume loss.    There is no evidence of hydrocephalus. There are no extra-axial fluid     collections.    ECHO     Fibrocalcific changes noted to the Aortic valve leaflets with minimally     restricted leaflet excursion.     Moderate aortic regurgitation is present.     The left atrium is mildly dilated.     Mild asymmetrical septal left ventricular hypertrophy is present.     There are no signs of LVOT obstruction.     Estimated left ventricular ejection fraction is 60-65 %.            PHYSICAL EXAM:    GENERAL: awake, alert, oriented     HEAD:  Atraumatic, Normocephalic    EYES: EOMI, PERRLA, conjunctiva and sclera clear    ENMT: Moist mucous membranes    NECK: Supple, No JVD, thyroid nontender    NERVOUS SYSTEM:  Alert & Oriented X3, PERRL, no facial asymmetry, tongue midline, moving all extremities, no focal motor or sensory deficits    CHEST/LUNG: Clear to auscultation bilaterally; No rales, rhonchi, wheezing, or rubs, s/p PPM, area looks healthy    HEART: Regular rate and rhythm; No murmurs, rubs, or gallops    ABDOMEN: Soft, Nontender, Nondistended; Bowel sounds present    EXTREMITIES:  2+ Peripheral Pulses, No clubbing, cyanosis, or edema    MUSCULOSKELTAL- no joint swelling or erythema    SKIN-no rash, no lesion        cholecalciferol 1000 Unit(s) Oral daily    finasteride 5 milliGRAM(s) Oral <User Schedule>    multivitamin 1 Tablet(s) Oral daily        A/P    87 yo male admitted for acute onset vertigo concerning for central lesion, also found to have bradycardia with new 1st degree AV block and worsening conduction disease.     Vertigo/Dizziness    Sinus Node Dysfunction: Abnormal EKG with 1st degree AVB  and RBBB    h/o gastric tumor GIST in 2015    BPH        Plan:    s/p PPM    f/u TFTs - wnl    appreciate neuro consult    CT head neg for acute pathology    f/u MRI brain and MRA head and neck all as OP    Discussed with patient - he does not take ASA at home and is in fact allergic to it    - VCD boots for DVT proph.    -hold statin, pt reports h/o 'low low cholesterol'     BPH s/p TURP    -cont finasteride every other day        Elevated blood pressure without h/o HTN    -pt states bp is usually low, 100/70's    start low dose amlodipine, discussed with patient        discussed with RN, Card and Neuro    DC time spent - 60 mins

## 2019-08-08 NOTE — DISCHARGE NOTE PROVIDER - CARE PROVIDER_API CALL
Cathy Hobson (NP; RN; DNP)  NP in Adult Health  [address] [place] [address]  Phone: [phone]  Fax: [phone]  Follow Up Time:     blanca dominguez  Phone: (   )    -  Fax: (   )    -  Follow Up Time:     Genesis Basilio)  Neurology  General  [address] [facility] [address], [address]  [place] [address]  Phone: [phone]  Fax: [phone]  Follow Up Time:

## 2019-08-08 NOTE — DISCHARGE NOTE NURSING/CASE MANAGEMENT/SOCIAL WORK - NSDCDPATPORTLINK_GEN_ALL_CORE
You can access the [facility] Patient Portal, offered by [facility], by registering with the following website: [email or web address]

## 2019-08-08 NOTE — PROGRESS NOTE ADULT - SUBJECTIVE AND OBJECTIVE BOX
HPI: Vertigo    ROS: Pt is an 89 yo male with PMH of  stomach cancer s/p partial gastrectomy, BPH, s/p TIRP, whose baseline BP is 100/70's and HR in 40's for approx. 6-10 yrs, who p/w Dizziness since Monday with inability to ambulate. Pt states he woke up on Monday morning and upon standing up from bed, immediately started having spinning sensation, describes it as if he was a ball rolling down a hill head first. Pt states he then laid down and symptoms persisted all night. Pt states when turning his head side to side that the vertical spinning recurs. At rest pt sx resolves.  8/8/19 : Pt underwent PPM placement  yesterday. No recurrent sx of dizziness or vertigo. Pt did not want to go for MRI as he was claustrophobic  and now since he is feeling better wants to post pone . He denies of any N/V or imbalance . Just OOB this morning and no dizziness in bed yesterday.     MEDICATIONS  (STANDING):  cholecalciferol 1000 Unit(s) Oral daily  finasteride 5 milliGRAM(s) Oral <User Schedule>  multivitamin 1 Tablet(s) Oral daily      Vital Signs Last 24 Hrs  T(C): 36.3 (08 Aug 2019 11:30), Max: 36.7 (08 Aug 2019 05:36)  T(F): 97.3 (08 Aug 2019 11:30), Max: 98 (08 Aug 2019 05:36)  HR: 62 (08 Aug 2019 11:30) (52 - 62)  BP: 132/65 (08 Aug 2019 11:30) (132/65 - 181/76)  BP(mean): --  RR: 18 (08 Aug 2019 11:30) (17 - 18)  SpO2: 99% (08 Aug 2019 11:30) (95% - 100%)     Neurological exam:  HF: A x O x 3. Appropriately interactive, normal affect. Speech fluent, No Aphasia or paraphasic errors.   CN: DEYANIRA, EOMI, VFF, no nystagmus, facial sensation normal, no NLFD, tongue midline, Palate moves equally   Motor: No pronator drift, Strength 5/5 in all 4 ext, normal bulk and tone    Sens: Intact to light touch   Reflexes: Symmetric and normal . BJ 2+, BR 2+, KJ 2+, AJ 1+, downgoing toes b/l  Coord:  No FNFA, dysmetria, KAMRAN intact   Gait/Balance: Cannot test    NIHSS: 0                        15.2   9.37  )-----------( 191      ( 08 Aug 2019 07:10 )             44.4     08-08    142  |  110<H>  |  14  ----------------------------<  100<H>  3.6   |  26  |  0.72    Ca    8.3<L>      08 Aug 2019 07:10  Phos  3.1     08-07  Mg     2.0     08-08    TPro  7.2  /  Alb  3.8  /  TBili  0.5  /  DBili  x   /  AST  18  /  ALT  20  /  AlkPhos  69  08-06    08-07 [id number] 5.4    08-07 Chol 137 LDL 85 HDL 41 Trig 54    Radiology report:  - CT Head  < from: CT Head No Cont (08.06.19 @ 15:50) >  IMPRESSION:    No acute intracranial findings.      < from: US Duplex Carotid Arteries Complete, Bilateral (08.06.19 @ 19:02) >  IMPRESSION:      No hemodynamic significant stenosis bilaterally.
88 year old male who is S/P Dual Chamber PPM, POD #1.     PMH: known chronic sinus bradycardia and RBBB s with 24 hours dizziness and vertigo. Mostly occurs with movement of head. Had near syncope but no syncope.    Cardiology: 88 year old male with known chronic sinus bradycardia and RBBB seen in office today with 24 hours dizziness and vertigo. Mostly occurs with movement of head. Had near syncope but no syncope. no chest pain or dyspnea. more prolonged pr interval also noted on ecg.      Vital Signs Last 24 Hrs  T(C): 36.7 (08 Aug 2019 05:36), Max: 36.8 (07 Aug 2019 11:06)  T(F): 98 (08 Aug 2019 05:36), Max: 98.2 (07 Aug 2019 11:06)  HR: 60 (08 Aug 2019 05:36) (46 - 61)  BP: 144/71 (08 Aug 2019 05:36) (144/71 - 181/76)  BP(mean): --  RR: 18 (08 Aug 2019 05:36) (17 - 18)  SpO2: 95% (08 Aug 2019 05:36) (95% - 100%)    PHYSICAL EXAMINATION:  GENERAL: In no apparent distress, well nourished, and hydrated.  HEAD:  Atraumatic, Normocephalic  EYES: EOMI, PERRLA, conjunctiva and sclera clear  ENMT: No tonsillar erythema, exudates, or enlargement; Moist mucous membranes, Good dentition, No lesions  NECK: Supple and normal thyroid.  No JVD or carotid bruit.  Carotid pulse is 2+ bilaterally.  HEART: Regular rate and rhythm; No murmurs, rubs, or gallops, prineo dressing in time, no drainage  PULMONARY: Clear to auscultation and perfusion.  No rales, wheezing, or rhonchi bilaterally.  ABDOMEN: Soft, Nontender, Nondistended; Bowel sounds present  EXTREMITIES:  2+ Peripheral Pulses, No clubbing, cyanosis, or edema  LYMPH: No lymphadenopathy noted  NEUROLOGICAL: Grossly nonfocal                Telemetry:  SR at 60 BPM  EKG:  SR at 60 BPM with RBBB
Patient is a 88y old  Male who presents with a chief complaint of Bradycardia, Vertigo (06 Aug 2019 18:46)      HPI:  Pt is an 87 yo male with a pmh/o stomach cancer s/p partial gastrectomy, bph s/p turp, whose baseline bp is 100/70's and HR in 40's for approx. 6-10 yrs, who p/w sensation as if the room is spinning vertically resulting in inability to ambulate. Pt states he woke up on Monday morning and upon standing up from bed, immediately started having spinning sensation, describes it as if he was a ball rolling down a hill head first. Pt states he then laid down and symptoms persisted all night. Pt states when turning his head side to side that the vertical spinning recurs. At rest pt sx resolves. Pt awoke Tue morning and sx were gone however he states he has been 'out of it' and attributes this sensation to his anxiety over the situation. Pt then saw PMD who performed EKG and sent pt to ED.    Of note, pt has been "back and forth" about needing a pace maker for some time. Pt has also been told he needs a MRI of his brain however after several attempts, he states that it was never performed because of severe anxiety. Pt states that when he had those incomplete studies, his stomach tumor was found. Pt describes one previous episode of vertigo when standing up to get in a boat. He also described that episode of vertically falling. Of note, pt father succumbed to complications of a brain tumor at 61 yrs old.     Pt denies chest pain, palpitations, n/v/d, fever, cough, leg pain, swelling, HA, h/o migraines, AMS, confusion, LOC, falling, numbness, tingling, loss of motor function, visual or auditory changes.    Pt endorses gait instability and diaphoresis and anxiety. (06 Aug 2019 18:46)    Cardiology: 88 year old male with known chronic sinus bradycardia and RBBB seen in office today with 24 hours dizziness and vertigo. Mostly occurs with movement of head. Had near syncope but no syncope. no chest pain or dyspnea. more prolonged pr interval also noted on ecg.    no new complaints. 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  Osteoarthritis: right knee  Colitis  Gastric tumor: 2015 GIST  Hard of hearing: b/l hearing aid  S/P colonoscopy:   History of cholecystectomy:   S/P partial gastrectomy:   S/P tonsillectomy: as a child  History of lumbar laminectomy:         MEDICATIONS  (STANDING):  cholecalciferol 1000 Unit(s) Oral daily  finasteride 5 milliGRAM(s) Oral <User Schedule>  multivitamin 1 Tablet(s) Oral daily    MEDICATIONS  (PRN):          Vital Signs Last 24 Hrs  T(C): 36.6 (07 Aug 2019 05:10), Max: 36.9 (06 Aug 2019 19:06)  T(F): 97.8 (07 Aug 2019 05:10), Max: 98.4 (06 Aug 2019 19:06)  HR: 53 (07 Aug 2019 05:10) (49 - 62)  BP: 135/71 (07 Aug 2019 05:10) (135/71 - 179/83)  BP(mean): --  RR: 18 (06 Aug 2019 21:00) (16 - 18)  SpO2: 98% (07 Aug 2019 05:10) (98% - 100%)    I&O's Summary      PHYSICAL EXAM  General Appearance:NAD  HEENT: NCAT  Neck:   Back:   Lungs: clear  Heart: [id number]  Abdomen: soft nt  Extremities: no edema  Skin:   Neurologic:       INTERPRETATION OF TELEMETRY:    ECG:        LABS:                          16.3   7.91  )-----------( 214      ( 06 Aug 2019 14:53 )             48.9     08-06    143  |  109<H>  |  15  ----------------------------<  95  3.9   |  28  |  0.95    Ca    9.2      06 Aug 2019 14:53  Mg     2.3     -    TPro  7.2  /  Alb  3.8  /  TBili  0.5  /  DBili  x   /  AST  18  /  ALT  20  /  AlkPhos  69  08-06    CARDIAC MARKERS ( 06 Aug 2019 18:32 )  <0.015 ng/mL / x     / x     / x     / x      CARDIAC MARKERS ( 06 Aug 2019 14:53 )  <0.015 ng/mL / x     / x     / x     / x              PT/INR - ( 06 Aug 2019 14:53 )   PT: 11.4 sec;   INR: 1.03 ratio         PTT - ( 06 Aug 2019 14:53 )  PTT:33.7 sec  Urinalysis Basic - ( 07 Aug 2019 02:15 )    Color: Yellow / Appearance: Clear / S.020 / pH: x  Gluc: x / Ketone: Negative  / Bili: Negative / Urobili: Negative mg/dL   Blood: x / Protein: 30 mg/dL / Nitrite: Negative   Leuk Esterase: Trace / RBC: 0-2 /HPF / WBC 0-2   Sq Epi: x / Non Sq Epi: Occasional / Bacteria: Occasional            RADIOLOGY & ADDITIONAL STUDIES:
Patient is a 88y old  Male who presents with a chief complaint of Bradycardia, Vertigo (08 Aug 2019 09:18)      Cardiology: 88 year old male with known chronic sinus bradycardia and RBBB seen in office today with 24 hours dizziness and vertigo. Mostly occurs with movement of head. Had near syncope but no syncope. no chest pain or dyspnea. more prolonged pr interval also noted on ecg.   Followup HPI:   no new complaints. sinus minesh overnite 40-60  - PPM placed . Patient without complaints today.    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      MEDICATIONS  (STANDING):  cholecalciferol 1000 Unit(s) Oral daily  finasteride 5 milliGRAM(s) Oral <User Schedule>  multivitamin 1 Tablet(s) Oral daily    MEDICATIONS  (PRN):          Vital Signs Last 24 Hrs  T(C): 36.7 (08 Aug 2019 05:36), Max: 36.8 (07 Aug 2019 11:06)  T(F): 98 (08 Aug 2019 05:36), Max: 98.2 (07 Aug 2019 11:06)  HR: 60 (08 Aug 2019 05:36) (46 - 61)  BP: 144/71 (08 Aug 2019 05:36) (144/71 - 181/76)  BP(mean): --  RR: 18 (08 Aug 2019 05:36) (17 - 18)  SpO2: 95% (08 Aug 2019 05:36) (95% - 100%)    I&O's Summary    07 Aug 2019 07:01  -  08 Aug 2019 07:00  --------------------------------------------------------  IN: 0 mL / OUT: 450 mL / NET: -450 mL        PHYSICAL EXAM  General Appearance: comfrotable  HEENT:   Neck:   Lungs: clear  Heart: pacer site without hematoma. No murmur or rub.  Abdomen:   Extremities: no edema  Neurologic:       INTERPRETATION OF TELEMETRY: sinus e=with rare  atrial pacing.    ECG:    LABS:                          15.2   9.37  )-----------( 191      ( 08 Aug 2019 07:10 )             44.4     08-08    142  |  110<H>  |  14  ----------------------------<  100<H>  3.6   |  26  |  0.72    Ca    8.3<L>      08 Aug 2019 07:10  Phos  3.1     08-07  Mg     2.0     08-08    TPro  7.2  /  Alb  3.8  /  TBili  0.5  /  DBili  x   /  AST  18  /  ALT  20  /  AlkPhos  69  08-06    CARDIAC MARKERS ( 07 Aug 2019 07:30 )  <0.015 ng/mL / x     / x     / x     / x      CARDIAC MARKERS ( 06 Aug 2019 18:32 )  <0.015 ng/mL / x     / x     / x     / x      CARDIAC MARKERS ( 06 Aug 2019 14:53 )  <0.015 ng/mL / x     / x     / x     / x          Lipid Panel  137  41  85  54      PT/INR - ( 07 Aug 2019 07:30 )   PT: 11.6 sec;   INR: 1.04 ratio         PTT - ( 07 Aug 2019 07:30 )  PTT:32.1 sec  Urinalysis Basic - ( 07 Aug 2019 02:15 )    Color: Yellow / Appearance: Clear / S.020 / pH: x  Gluc: x / Ketone: Negative  / Bili: Negative / Urobili: Negative mg/dL   Blood: x / Protein: 30 mg/dL / Nitrite: Negative   Leuk Esterase: Trace / RBC: 0-2 /HPF / WBC 0-2   Sq Epi: x / Non Sq Epi: Occasional / Bacteria: Occasional            RADIOLOGY & ADDITIONAL STUDIES:    IMPRESSION:  1. Sick sinus syndrome. Stable post DDD PPM.  2. Systolic BP running high over night.  Outpatient followup of this within 7 days with Dr. Rolan Patel. Cardiac status appears stable for discharge.   PLAN:
late entry note, pt seen in the morning:  Patient is a 88y old  Male who presents with a chief complaint of Bradycardia, Vertigo (07 Aug 2019 08:15)  pt NPO for PPM today    SUBJECTIVE:   sleeping, easily arousable or awake  no cp palps sob  no abdo pain  no tingling or numbness anywhere      T(C): 36.4 (19 @ 19:15), Max: 36.8 (19 @ 11:06)  HR: 61 (19 @ 19:15) (46 - 61)  BP: 181/76 (19 @ 19:05) (135/71 - 181/76)  RR: 17 (19 @ 19:15) (17 - 18)  SpO2: 97% (19 @ 19:15) (97% - 100%)  Wt(kg): --      LABS:                        14.9   7.28  )-----------( 194      ( 07 Aug 2019 07:30 )             44.3     08-07    146<H>  |  113<H>  |  18  ----------------------------<  100<H>  4.0   |  28  |  0.85    Ca    8.9      07 Aug 2019 07:30  Phos  3.1     08-07  Mg     2.3     08-07    TPro  7.2  /  Alb  3.8  /  TBili  0.5  /  DBili  x   /  AST  18  /  ALT  20  /  AlkPhos  69  08-06    PT/INR - ( 07 Aug 2019 07:30 )   PT: 11.6 sec;   INR: 1.04 ratio         PTT - ( 07 Aug 2019 07:30 )  PTT:32.1 sec  Urinalysis Basic - ( 07 Aug 2019 02:15 )    Color: Yellow / Appearance: Clear / S.020 / pH: x  Gluc: x / Ketone: Negative  / Bili: Negative / Urobili: Negative mg/dL   Blood: x / Protein: 30 mg/dL / Nitrite: Negative   Leuk Esterase: Trace / RBC: 0-2 /HPF / WBC 0-2   Sq Epi: x / Non Sq Epi: Occasional / Bacteria: Occasional    CARDIAC MARKERS ( 07 Aug 2019 07:30 )  <0.015 ng/mL / x     / x     / x     / x      CARDIAC MARKERS ( 06 Aug 2019 18:32 )  <0.015 ng/mL / x     / x     / x     / x      CARDIAC MARKERS ( 06 Aug 2019 14:53 )  <0.015 ng/mL / x     / x     / x     / x        IMAGING reviewed by me:    < from: CT Head No Cont (19 @ 15:50) >  There is no evidence of acute infarction, intracranial hemorrhage or mass   lesion.  There is hypoattenuation of the subcortical and periventricular   white matter, which is nonspecific finding, but most likely represents   sequela of chronic microvascular ischemic disease. There are   atherosclerotic calcifications of the cavernous and supraclinoid internal   carotid arteries bilaterally. There is prominence of the cortical sulci   related to underlying brain parenchymal volume loss.    There is no evidence of hydrocephalus. There are no extra-axial fluid   collections.    The patient is status post bilateral lens replacement. Visualized   intraorbital contents are otherwise. There is minimal mucosal thickening   in the left maxillary sinus, scattered mucosal inflammatory changes in   the bilateral ethmoid air cells. The mastoid air cells are clear. The   visualized soft tissues and osseous structures appear unremarkable.    < end of copied text >      PHYSICAL EXAM:  GENERAL: awake, alert, oriented   HEAD:  Atraumatic, Normocephalic  EYES: EOMI, PERRLA, conjunctiva and sclera clear  ENMT: Moist mucous membranes  NECK: Supple, No JVD, thyroid nontender  NERVOUS SYSTEM:  Alert & Oriented X3, PERRL, no facial asymmetry, tongue midline, moving all extremities, no focal motor or sensory deficits  CHEST/LUNG: Clear to auscultation bilaterally; No rales, rhonchi, wheezing, or rubs  HEART: Regular rate and rhythm; No murmurs, rubs, or gallops  ABDOMEN: Soft, Nontender, Nondistended; Bowel sounds present  EXTREMITIES:  2+ Peripheral Pulses, No clubbing, cyanosis, or edema  MUSCULOSKELTAL- no joint swelling or erythema  SKIN-no rash, no lesion  CATHETERS AND LINES:      ceFAZolin  Injectable. 1000 milliGRAM(s) IV Push every 8 hours  cholecalciferol 1000 Unit(s) Oral daily  finasteride 5 milliGRAM(s) Oral <User Schedule>  multivitamin 1 Tablet(s) Oral daily

## 2019-08-08 NOTE — DISCHARGE NOTE PROVIDER - PROVIDER TOKENS
PROVIDER:[TOKEN:[[id number]]],FREE:[LAST:[angelica],FIRST:[blanca],PHONE:[(   )    -],FAX:[(   )    -]],PROVIDER:[TOKEN:[[id number]]]

## 2019-08-08 NOTE — DISCHARGE NOTE PROVIDER - CARE PROVIDERS DIRECT ADDRESSES
[email or web address]

## 2019-08-08 NOTE — PROGRESS NOTE ADULT - ASSESSMENT
· Assessment		  Pt is an 87 yo male with a pmh/o stomach cancer s/p partial gastrectomy, bph s/p turp, whose baseline bp is 100/70's and HR in 40's for approx. 6-10 yrs, who p/w sensation as if the room is spinning vertically resulting in inability to ambulate.     Acute Vertigo with persistent Unsteady gait    Need to R/o CNS pathology    Pt understands need for MRI scan.    Scheduled for PPM today.    Pt has mR compatible PPM     CT and carotid doppler neg for acute pathology.    If Pt still Symptomatic with Vertigo  needs MRI brain with MRA brain/ carotids.    Pt wants come as out pt follow up.    If pt Vertigo resolved now, Ambulating, can be d/c and followed up.    Discussed with Pt and Hospitalist.
1. dizziness of uncertain etiology,. possible tia or bpv. seems unlikely to be due to bradycardia  2. Sinus bradycardia with rbbb and prolonged pr interval, near syncope- chronic bradycardia now appears symptomatic ie dyspneic and fatigued with golf etc. sn dysfunction    Plan: PPM today
87 yo male admitted for acute onset vertigo concerning for central lesion, also found to have bradycardia with new 1st degree AV block and worsening conduction disease.     Vertigo/Dizziness  Sinus Node Dysfunction: Abnormal EKG with 1st degree AVB  and RBBB  h/o gastric tumor GIST in 2015  BPH    Plan:  discussed with Card/EP- for PPM today  f/u ECHO  f/u TFTs  appreciate neuro consult  CT head neg for acute pathology  f/u MRI brain and MRA head and neck   ASA and VTE PX held for PPM today, will resume when cleared by Sx  - VCD boots for DVT proph.  -hold statin, pt reports h/o 'low low cholesterol'   BPH s/p TURP  -cont finasteride every other day    Elevated blood pressure without h/o HTN  -pt states bp is usually low, 100/70's  monitor
ASSESSMENT AND PLAN  88yMale POD#1, Status Post  Dual Chamber PPM. Patient denies any complaints of chest pain, SOB, dizziness, palpitations or any significant discomfort.  Stable for Discharge from EP standpoint.    Device interrogated, normal function. Base rate decreased to 50 BPM, at lead 548 ohm. vent lead 757 ohms .  Thresholds WNL    Postop instructions provided and patient understood.  Patient to follow up in EP clinic in 2 weeks, or sooner with questions and concerns.

## 2019-08-13 DIAGNOSIS — I49.5 SICK SINUS SYNDROME: ICD-10-CM

## 2019-08-13 DIAGNOSIS — Z88.5 ALLERGY STATUS TO NARCOTIC AGENT: ICD-10-CM

## 2019-08-13 DIAGNOSIS — R00.1 BRADYCARDIA, UNSPECIFIED: ICD-10-CM

## 2019-08-13 DIAGNOSIS — I44.0 ATRIOVENTRICULAR BLOCK, FIRST DEGREE: ICD-10-CM

## 2019-08-13 DIAGNOSIS — R42 DIZZINESS AND GIDDINESS: ICD-10-CM

## 2019-08-13 DIAGNOSIS — F40.240 CLAUSTROPHOBIA: ICD-10-CM

## 2019-08-13 DIAGNOSIS — Z85.828 PERSONAL HISTORY OF OTHER MALIGNANT NEOPLASM OF SKIN: ICD-10-CM

## 2019-08-13 DIAGNOSIS — Z85.00 PERSONAL HISTORY OF MALIGNANT NEOPLASM OF UNSPECIFIED DIGESTIVE ORGAN: ICD-10-CM

## 2019-08-13 DIAGNOSIS — R03.0 ELEVATED BLOOD-PRESSURE READING, WITHOUT DIAGNOSIS OF HYPERTENSION: ICD-10-CM

## 2019-08-13 DIAGNOSIS — Z90.3 ACQUIRED ABSENCE OF STOMACH [PART OF]: ICD-10-CM

## 2019-08-13 DIAGNOSIS — Z88.6 ALLERGY STATUS TO ANALGESIC AGENT: ICD-10-CM

## 2019-08-13 DIAGNOSIS — N40.0 BENIGN PROSTATIC HYPERPLASIA WITHOUT LOWER URINARY TRACT SYMPTOMS: ICD-10-CM

## 2019-08-13 DIAGNOSIS — R26.81 UNSTEADINESS ON FEET: ICD-10-CM

## 2019-08-13 DIAGNOSIS — I45.10 UNSPECIFIED RIGHT BUNDLE-BRANCH BLOCK: ICD-10-CM

## 2019-08-22 ENCOUNTER — APPOINTMENT (OUTPATIENT)
Dept: ELECTROPHYSIOLOGY | Facility: CLINIC | Age: 84
End: 2019-08-22
Payer: MEDICARE

## 2019-08-22 VITALS
HEIGHT: 70 IN | SYSTOLIC BLOOD PRESSURE: 130 MMHG | BODY MASS INDEX: 25.77 KG/M2 | HEART RATE: 51 BPM | WEIGHT: 180 LBS | DIASTOLIC BLOOD PRESSURE: 65 MMHG

## 2019-08-22 PROCEDURE — 99024 POSTOP FOLLOW-UP VISIT: CPT

## 2019-09-04 ENCOUNTER — APPOINTMENT (OUTPATIENT)
Dept: ELECTROPHYSIOLOGY | Facility: CLINIC | Age: 84
End: 2019-09-04
Payer: MEDICARE

## 2019-09-04 PROCEDURE — 93296 REM INTERROG EVL PM/IDS: CPT

## 2019-09-04 PROCEDURE — 93294 REM INTERROG EVL PM/LDLS PM: CPT

## 2019-10-04 ENCOUNTER — APPOINTMENT (OUTPATIENT)
Dept: DERMATOLOGY | Facility: CLINIC | Age: 84
End: 2019-10-04
Payer: MEDICARE

## 2019-10-04 DIAGNOSIS — Z95.0 PRESENCE OF CARDIAC PACEMAKER: ICD-10-CM

## 2019-10-04 DIAGNOSIS — Z00.00 ENCOUNTER FOR GENERAL ADULT MEDICAL EXAMINATION W/OUT ABNORMAL FINDINGS: ICD-10-CM

## 2019-10-04 PROCEDURE — 99213 OFFICE O/P EST LOW 20 MIN: CPT | Mod: 25

## 2019-10-04 PROCEDURE — 17000 DESTRUCT PREMALG LESION: CPT

## 2019-10-04 NOTE — PHYSICAL EXAM
[Oriented x 3] : ~L oriented x 3 [Alert] : alert [Well Nourished] : well nourished [Full Body Skin Exam Performed] : performed [FreeTextEntry3] : A full skin exam was performed including the scalp, face (including lips, ears, nose and eyes), neck, chest, abdomen, back, buttocks, upper extremities and lower extremities.  The patient declined examination of the genitalia.  \par The exam revealed the following benign growths:\par Seborrheic keratoses.\par Lentigines.\par Cherry angioma.\par \par keratotic papules to the right cheek.

## 2019-10-04 NOTE — HISTORY OF PRESENT ILLNESS
[FreeTextEntry1] : Patient presents for skin examination. [de-identified] : Denies new, changing, bleeding or tender lesions on the skin over the past year.\par

## 2019-11-01 ENCOUNTER — APPOINTMENT (OUTPATIENT)
Dept: ELECTROPHYSIOLOGY | Facility: CLINIC | Age: 84
End: 2019-11-01
Payer: MEDICARE

## 2019-11-01 VITALS — HEIGHT: 70 IN | BODY MASS INDEX: 25.77 KG/M2 | WEIGHT: 180 LBS

## 2019-11-01 VITALS — OXYGEN SATURATION: 100 % | SYSTOLIC BLOOD PRESSURE: 139 MMHG | HEART RATE: 57 BPM | DIASTOLIC BLOOD PRESSURE: 80 MMHG

## 2019-11-01 DIAGNOSIS — N40.0 BENIGN PROSTATIC HYPERPLASIA WITHOUT LOWER URINARY TRACT SYMPMS: ICD-10-CM

## 2019-11-01 DIAGNOSIS — I10 ESSENTIAL (PRIMARY) HYPERTENSION: ICD-10-CM

## 2019-11-01 PROCEDURE — 93280 PM DEVICE PROGR EVAL DUAL: CPT

## 2019-11-01 RX ORDER — FINASTERIDE 5 MG/1
5 TABLET, FILM COATED ORAL
Refills: 0 | Status: ACTIVE | COMMUNITY

## 2019-11-01 RX ORDER — MECLIZINE HYDROCHLORIDE 12.5 MG/1
12.5 TABLET ORAL
Refills: 0 | Status: DISCONTINUED | COMMUNITY
End: 2019-11-01

## 2020-02-04 ENCOUNTER — APPOINTMENT (OUTPATIENT)
Dept: ELECTROPHYSIOLOGY | Facility: CLINIC | Age: 85
End: 2020-02-04
Payer: MEDICARE

## 2020-02-04 PROCEDURE — 93296 REM INTERROG EVL PM/IDS: CPT

## 2020-02-04 PROCEDURE — 93294 REM INTERROG EVL PM/LDLS PM: CPT

## 2020-05-05 ENCOUNTER — APPOINTMENT (OUTPATIENT)
Dept: ELECTROPHYSIOLOGY | Facility: CLINIC | Age: 85
End: 2020-05-05
Payer: MEDICARE

## 2020-05-05 PROCEDURE — 93294 REM INTERROG EVL PM/LDLS PM: CPT

## 2020-05-05 PROCEDURE — 93296 REM INTERROG EVL PM/IDS: CPT

## 2020-07-14 RX ORDER — RIVAROXABAN 20 MG/1
20 TABLET, FILM COATED ORAL
Refills: 0 | Status: ACTIVE | COMMUNITY

## 2020-07-14 RX ORDER — AMLODIPINE BESYLATE 2.5 MG/1
2.5 TABLET ORAL DAILY
Refills: 0 | Status: DISCONTINUED | COMMUNITY
Start: 2019-11-01 | End: 2020-07-14

## 2020-07-15 ENCOUNTER — APPOINTMENT (OUTPATIENT)
Dept: ELECTROPHYSIOLOGY | Facility: CLINIC | Age: 85
End: 2020-07-15
Payer: MEDICARE

## 2020-07-15 VITALS
OXYGEN SATURATION: 100 % | TEMPERATURE: 98.7 F | WEIGHT: 185 LBS | DIASTOLIC BLOOD PRESSURE: 71 MMHG | HEIGHT: 70 IN | RESPIRATION RATE: 14 BRPM | SYSTOLIC BLOOD PRESSURE: 125 MMHG | BODY MASS INDEX: 26.48 KG/M2 | HEART RATE: 63 BPM

## 2020-07-15 PROCEDURE — 93280 PM DEVICE PROGR EVAL DUAL: CPT

## 2020-07-25 DIAGNOSIS — L25.5 UNSPECIFIED CONTACT DERMATITIS DUE TO PLANTS, EXCEPT FOOD: ICD-10-CM

## 2020-08-06 ENCOUNTER — APPOINTMENT (OUTPATIENT)
Dept: ELECTROPHYSIOLOGY | Facility: CLINIC | Age: 85
End: 2020-08-06
Payer: MEDICARE

## 2020-08-07 PROCEDURE — 93294 REM INTERROG EVL PM/LDLS PM: CPT

## 2020-08-07 PROCEDURE — 93296 REM INTERROG EVL PM/IDS: CPT

## 2020-10-07 ENCOUNTER — APPOINTMENT (OUTPATIENT)
Dept: DERMATOLOGY | Facility: CLINIC | Age: 85
End: 2020-10-07
Payer: MEDICARE

## 2020-10-07 PROCEDURE — 99213 OFFICE O/P EST LOW 20 MIN: CPT

## 2020-10-07 NOTE — PHYSICAL EXAM
[Alert] : alert [Oriented x 3] : ~L oriented x 3 [Well Nourished] : well nourished [Full Body Skin Exam Performed] : performed [FreeTextEntry3] : A full skin exam was performed including the scalp, face (including lips, ears, nose and eyes), neck, chest, abdomen, back, buttocks, upper extremities and lower extremities.  The patient declined examination of the genitalia.  \par The exam revealed the following benign growths:\par Seborrheic keratoses.\par Lentigines.\par Cherry angioma.\par

## 2020-10-07 NOTE — HISTORY OF PRESENT ILLNESS
[FreeTextEntry1] : Patient presents for skin examination. [de-identified] : Denies new, changing, bleeding or tender lesions on the skin over the past 3 months.\par

## 2020-10-14 ENCOUNTER — APPOINTMENT (OUTPATIENT)
Dept: ELECTROPHYSIOLOGY | Facility: CLINIC | Age: 85
End: 2020-10-14

## 2020-11-05 ENCOUNTER — APPOINTMENT (OUTPATIENT)
Dept: ELECTROPHYSIOLOGY | Facility: CLINIC | Age: 85
End: 2020-11-05

## 2020-11-06 ENCOUNTER — APPOINTMENT (OUTPATIENT)
Dept: ELECTROPHYSIOLOGY | Facility: CLINIC | Age: 85
End: 2020-11-06
Payer: MEDICARE

## 2020-11-06 PROCEDURE — 93294 REM INTERROG EVL PM/LDLS PM: CPT

## 2020-11-06 PROCEDURE — 93296 REM INTERROG EVL PM/IDS: CPT

## 2021-02-09 ENCOUNTER — APPOINTMENT (OUTPATIENT)
Dept: ELECTROPHYSIOLOGY | Facility: CLINIC | Age: 86
End: 2021-02-09
Payer: MEDICARE

## 2021-02-10 PROCEDURE — 93294 REM INTERROG EVL PM/LDLS PM: CPT

## 2021-02-10 PROCEDURE — 93296 REM INTERROG EVL PM/IDS: CPT

## 2021-05-12 ENCOUNTER — NON-APPOINTMENT (OUTPATIENT)
Age: 86
End: 2021-05-12

## 2021-05-12 ENCOUNTER — APPOINTMENT (OUTPATIENT)
Dept: ELECTROPHYSIOLOGY | Facility: CLINIC | Age: 86
End: 2021-05-12
Payer: MEDICARE

## 2021-05-12 PROCEDURE — 93294 REM INTERROG EVL PM/LDLS PM: CPT

## 2021-05-12 PROCEDURE — 93296 REM INTERROG EVL PM/IDS: CPT

## 2021-07-14 ENCOUNTER — NON-APPOINTMENT (OUTPATIENT)
Age: 86
End: 2021-07-14

## 2021-07-14 ENCOUNTER — APPOINTMENT (OUTPATIENT)
Dept: ELECTROPHYSIOLOGY | Facility: CLINIC | Age: 86
End: 2021-07-14
Payer: MEDICARE

## 2021-07-14 VITALS
RESPIRATION RATE: 14 BRPM | DIASTOLIC BLOOD PRESSURE: 78 MMHG | BODY MASS INDEX: 24.05 KG/M2 | HEART RATE: 57 BPM | SYSTOLIC BLOOD PRESSURE: 125 MMHG | WEIGHT: 168 LBS | HEIGHT: 70 IN | OXYGEN SATURATION: 100 %

## 2021-07-14 PROCEDURE — 93280 PM DEVICE PROGR EVAL DUAL: CPT

## 2021-07-22 ENCOUNTER — EMERGENCY (EMERGENCY)
Facility: HOSPITAL | Age: 86
LOS: 0 days | Discharge: HOME | End: 2021-07-22
Attending: STUDENT IN AN ORGANIZED HEALTH CARE EDUCATION/TRAINING PROGRAM
Payer: MEDICARE

## 2021-07-22 VITALS
RESPIRATION RATE: 17 BRPM | SYSTOLIC BLOOD PRESSURE: 146 MMHG | DIASTOLIC BLOOD PRESSURE: 86 MMHG | OXYGEN SATURATION: 100 % | HEART RATE: 60 BPM

## 2021-07-22 VITALS
HEIGHT: 70 IN | WEIGHT: 169.98 LBS | SYSTOLIC BLOOD PRESSURE: 157 MMHG | TEMPERATURE: 98 F | RESPIRATION RATE: 18 BRPM | OXYGEN SATURATION: 100 % | HEART RATE: 53 BPM | DIASTOLIC BLOOD PRESSURE: 74 MMHG

## 2021-07-22 DIAGNOSIS — Z91.048 OTHER NONMEDICINAL SUBSTANCE ALLERGY STATUS: ICD-10-CM

## 2021-07-22 DIAGNOSIS — Z90.3 ACQUIRED ABSENCE OF STOMACH [PART OF]: ICD-10-CM

## 2021-07-22 DIAGNOSIS — Z90.49 ACQUIRED ABSENCE OF OTHER SPECIFIED PARTS OF DIGESTIVE TRACT: Chronic | ICD-10-CM

## 2021-07-22 DIAGNOSIS — Z20.822 CONTACT WITH AND (SUSPECTED) EXPOSURE TO COVID-19: ICD-10-CM

## 2021-07-22 DIAGNOSIS — Z90.49 ACQUIRED ABSENCE OF OTHER SPECIFIED PARTS OF DIGESTIVE TRACT: ICD-10-CM

## 2021-07-22 DIAGNOSIS — Z98.890 OTHER SPECIFIED POSTPROCEDURAL STATES: Chronic | ICD-10-CM

## 2021-07-22 DIAGNOSIS — Z90.3 ACQUIRED ABSENCE OF STOMACH [PART OF]: Chronic | ICD-10-CM

## 2021-07-22 DIAGNOSIS — M19.90 UNSPECIFIED OSTEOARTHRITIS, UNSPECIFIED SITE: ICD-10-CM

## 2021-07-22 DIAGNOSIS — Z98.890 OTHER SPECIFIED POSTPROCEDURAL STATES: ICD-10-CM

## 2021-07-22 DIAGNOSIS — C44.621 SQUAMOUS CELL CARCINOMA OF SKIN OF UNSPECIFIED UPPER LIMB, INCLUDING SHOULDER: ICD-10-CM

## 2021-07-22 DIAGNOSIS — Z95.0 PRESENCE OF CARDIAC PACEMAKER: ICD-10-CM

## 2021-07-22 DIAGNOSIS — Z79.899 OTHER LONG TERM (CURRENT) DRUG THERAPY: ICD-10-CM

## 2021-07-22 DIAGNOSIS — R42 DIZZINESS AND GIDDINESS: ICD-10-CM

## 2021-07-22 DIAGNOSIS — D49.0 NEOPLASM OF UNSPECIFIED BEHAVIOR OF DIGESTIVE SYSTEM: ICD-10-CM

## 2021-07-22 DIAGNOSIS — Z87.19 PERSONAL HISTORY OF OTHER DISEASES OF THE DIGESTIVE SYSTEM: ICD-10-CM

## 2021-07-22 DIAGNOSIS — H91.90 UNSPECIFIED HEARING LOSS, UNSPECIFIED EAR: ICD-10-CM

## 2021-07-22 DIAGNOSIS — Z88.5 ALLERGY STATUS TO NARCOTIC AGENT: ICD-10-CM

## 2021-07-22 DIAGNOSIS — Z90.89 ACQUIRED ABSENCE OF OTHER ORGANS: Chronic | ICD-10-CM

## 2021-07-22 DIAGNOSIS — Z90.89 ACQUIRED ABSENCE OF OTHER ORGANS: ICD-10-CM

## 2021-07-22 DIAGNOSIS — Z88.6 ALLERGY STATUS TO ANALGESIC AGENT: ICD-10-CM

## 2021-07-22 LAB
ALBUMIN SERPL ELPH-MCNC: 3.6 G/DL — SIGNIFICANT CHANGE UP (ref 3.3–5)
ALP SERPL-CCNC: 69 U/L — SIGNIFICANT CHANGE UP (ref 40–120)
ALT FLD-CCNC: 22 U/L — SIGNIFICANT CHANGE UP (ref 12–78)
ANION GAP SERPL CALC-SCNC: 3 MMOL/L — LOW (ref 5–17)
APTT BLD: 47.7 SEC — HIGH (ref 27.5–35.5)
AST SERPL-CCNC: 18 U/L — SIGNIFICANT CHANGE UP (ref 15–37)
BASOPHILS # BLD AUTO: 0.02 K/UL — SIGNIFICANT CHANGE UP (ref 0–0.2)
BASOPHILS NFR BLD AUTO: 0.3 % — SIGNIFICANT CHANGE UP (ref 0–2)
BILIRUB SERPL-MCNC: 0.5 MG/DL — SIGNIFICANT CHANGE UP (ref 0.2–1.2)
BUN SERPL-MCNC: 17 MG/DL — SIGNIFICANT CHANGE UP (ref 7–23)
CALCIUM SERPL-MCNC: 8.7 MG/DL — SIGNIFICANT CHANGE UP (ref 8.5–10.1)
CHLORIDE SERPL-SCNC: 108 MMOL/L — SIGNIFICANT CHANGE UP (ref 96–108)
CO2 SERPL-SCNC: 30 MMOL/L — SIGNIFICANT CHANGE UP (ref 22–31)
CREAT SERPL-MCNC: 0.97 MG/DL — SIGNIFICANT CHANGE UP (ref 0.5–1.3)
EOSINOPHIL # BLD AUTO: 0.11 K/UL — SIGNIFICANT CHANGE UP (ref 0–0.5)
EOSINOPHIL NFR BLD AUTO: 1.5 % — SIGNIFICANT CHANGE UP (ref 0–6)
GLUCOSE SERPL-MCNC: 112 MG/DL — HIGH (ref 70–99)
HCT VFR BLD CALC: 45.5 % — SIGNIFICANT CHANGE UP (ref 39–50)
HGB BLD-MCNC: 15 G/DL — SIGNIFICANT CHANGE UP (ref 13–17)
IMM GRANULOCYTES NFR BLD AUTO: 0.4 % — SIGNIFICANT CHANGE UP (ref 0–1.5)
INR BLD: 1.86 RATIO — HIGH (ref 0.88–1.16)
LYMPHOCYTES # BLD AUTO: 1.43 K/UL — SIGNIFICANT CHANGE UP (ref 1–3.3)
LYMPHOCYTES # BLD AUTO: 18.9 % — SIGNIFICANT CHANGE UP (ref 13–44)
MCHC RBC-ENTMCNC: 32.1 PG — SIGNIFICANT CHANGE UP (ref 27–34)
MCHC RBC-ENTMCNC: 33 GM/DL — SIGNIFICANT CHANGE UP (ref 32–36)
MCV RBC AUTO: 97.2 FL — SIGNIFICANT CHANGE UP (ref 80–100)
MONOCYTES # BLD AUTO: 0.43 K/UL — SIGNIFICANT CHANGE UP (ref 0–0.9)
MONOCYTES NFR BLD AUTO: 5.7 % — SIGNIFICANT CHANGE UP (ref 2–14)
NEUTROPHILS # BLD AUTO: 5.54 K/UL — SIGNIFICANT CHANGE UP (ref 1.8–7.4)
NEUTROPHILS NFR BLD AUTO: 73.2 % — SIGNIFICANT CHANGE UP (ref 43–77)
PLATELET # BLD AUTO: 217 K/UL — SIGNIFICANT CHANGE UP (ref 150–400)
POTASSIUM SERPL-MCNC: 3.9 MMOL/L — SIGNIFICANT CHANGE UP (ref 3.5–5.3)
POTASSIUM SERPL-SCNC: 3.9 MMOL/L — SIGNIFICANT CHANGE UP (ref 3.5–5.3)
PROT SERPL-MCNC: 6.9 GM/DL — SIGNIFICANT CHANGE UP (ref 6–8.3)
PROTHROM AB SERPL-ACNC: 21 SEC — HIGH (ref 10.6–13.6)
RBC # BLD: 4.68 M/UL — SIGNIFICANT CHANGE UP (ref 4.2–5.8)
RBC # FLD: 13.3 % — SIGNIFICANT CHANGE UP (ref 10.3–14.5)
SARS-COV-2 RNA SPEC QL NAA+PROBE: SIGNIFICANT CHANGE UP
SODIUM SERPL-SCNC: 141 MMOL/L — SIGNIFICANT CHANGE UP (ref 135–145)
TROPONIN I SERPL-MCNC: <0.015 NG/ML — SIGNIFICANT CHANGE UP (ref 0.01–0.04)
WBC # BLD: 7.56 K/UL — SIGNIFICANT CHANGE UP (ref 3.8–10.5)
WBC # FLD AUTO: 7.56 K/UL — SIGNIFICANT CHANGE UP (ref 3.8–10.5)

## 2021-07-22 PROCEDURE — 80053 COMPREHEN METABOLIC PANEL: CPT

## 2021-07-22 PROCEDURE — 99284 EMERGENCY DEPT VISIT MOD MDM: CPT | Mod: 25

## 2021-07-22 PROCEDURE — 70450 CT HEAD/BRAIN W/O DYE: CPT | Mod: 26,MA

## 2021-07-22 PROCEDURE — U0003: CPT

## 2021-07-22 PROCEDURE — U0005: CPT

## 2021-07-22 PROCEDURE — 71046 X-RAY EXAM CHEST 2 VIEWS: CPT

## 2021-07-22 PROCEDURE — 85025 COMPLETE CBC W/AUTO DIFF WBC: CPT

## 2021-07-22 PROCEDURE — 84484 ASSAY OF TROPONIN QUANT: CPT

## 2021-07-22 PROCEDURE — 93005 ELECTROCARDIOGRAM TRACING: CPT

## 2021-07-22 PROCEDURE — 85610 PROTHROMBIN TIME: CPT

## 2021-07-22 PROCEDURE — 85730 THROMBOPLASTIN TIME PARTIAL: CPT

## 2021-07-22 PROCEDURE — 93010 ELECTROCARDIOGRAM REPORT: CPT

## 2021-07-22 PROCEDURE — 36415 COLL VENOUS BLD VENIPUNCTURE: CPT

## 2021-07-22 PROCEDURE — 99284 EMERGENCY DEPT VISIT MOD MDM: CPT

## 2021-07-22 PROCEDURE — 70450 CT HEAD/BRAIN W/O DYE: CPT

## 2021-07-22 PROCEDURE — 71046 X-RAY EXAM CHEST 2 VIEWS: CPT | Mod: 26

## 2021-07-22 RX ORDER — MECLIZINE HCL 12.5 MG
1 TABLET ORAL
Qty: 21 | Refills: 0
Start: 2021-07-22 | End: 2021-07-28

## 2021-07-22 RX ORDER — SODIUM CHLORIDE 9 MG/ML
1000 INJECTION INTRAMUSCULAR; INTRAVENOUS; SUBCUTANEOUS ONCE
Refills: 0 | Status: COMPLETED | OUTPATIENT
Start: 2021-07-22 | End: 2021-07-22

## 2021-07-22 RX ORDER — MECLIZINE HCL 12.5 MG
50 TABLET ORAL ONCE
Refills: 0 | Status: COMPLETED | OUTPATIENT
Start: 2021-07-22 | End: 2021-07-22

## 2021-07-22 RX ADMIN — Medication 50 MILLIGRAM(S): at 13:24

## 2021-07-22 RX ADMIN — SODIUM CHLORIDE 1000 MILLILITER(S): 9 INJECTION INTRAMUSCULAR; INTRAVENOUS; SUBCUTANEOUS at 13:24

## 2021-07-22 NOTE — ED ADULT NURSE NOTE - NSIMPLEMENTINTERV_GEN_ALL_ED
Implemented All Fall with Harm Risk Interventions:  [place] to call system. Call bell, personal items and telephone within reach. Instruct patient to call for assistance. Room bathroom lighting operational. Non-slip footwear when patient is off stretcher. Physically safe environment: no spills, clutter or unnecessary equipment. Stretcher in lowest position, wheels locked, appropriate side rails in place. Provide visual cue, wrist band, yellow gown, etc. Monitor gait and stability. Monitor for mental status changes and reorient to person, place, and time. Review medications for side effects contributing to fall risk. Reinforce activity limits and safety measures with patient and family. Provide visual clues: red socks.

## 2021-07-22 NOTE — ED STATDOCS - OBJECTIVE STATEMENT
89 y/o male with a PMHx of Pacemaker, presents to the ED c/o room spinning dizziness since yesterday. Pt was laying on the concrete underneath a table doing some work and when he turned over the sx occurred. Sx exacerbate with head movement. Non smoker. 89 y/o male with a PMHx of Pacemaker, presents to the ED c/o room spinning dizziness since yesterday. Pt was laying on the concrete underneath a table doing some work and when he turned over the sx occurred. Sx exacerbates with head movement. Sx improved this morning but still continued. Last eval of pacemaker was 2 weeks ago. Non smoker. Cardio: Dr. Patel. 89 y/o male with a PMHx of Pacemaker, presents to the ED c/o room spinning dizziness since yesterday. Pt was laying on the concrete underneath a table doing some work and when he turned over the sx occurred. Sx exacerbates with head movement. Sx improved this morning but still continued. Last eval of pacemaker was 2 weeks ago. Non smoker. Cardio: Dr. Patel.    - JH, no other neuro sx. Still able to walk. No neck pain, trauma, or fever. Sx improved with sitting still.

## 2021-07-22 NOTE — ED STATDOCS - NEUROLOGICAL, MLM
sensation is normal and strength is normal. sensation is normal and strength is normal. CN 2-12 intact. No dysmetria, no pronator drift, 45/5 strength extremities normal speech memory sensation is normal and strength is normal. CN 2-12 intact. No dysmetria, no pronator drift, 5/5 strength extremities x4, normal speech memory

## 2021-07-22 NOTE — ED STATDOCS - NSFOLLOWUPINSTRUCTIONS_ED_ALL_ED_FT
Please follow up with your primary care provider for further concerns you may have regarding your general health. Attached you will find your results from today's visit. Continue taking your medications as prescribed and keep your upcoming medical appointments.    .• Neurology and ortho referral  Vertigo  WHAT YOU NEED TO KNOW:  Vertigo is a condition that causes you to feel dizzy. You may feel that you or everything around you is moving or spinning. You may also feel like you are being pulled down or toward your side.   DISCHARGE INSTRUCTIONS:  Seek care immediately if:   •You have a headache and a stiff neck.  •You have shaking chills and a fever.   •You vomit over and over with no relief.   •You have blood, pus, or fluid coming out of your ears.  •You are confused.   Contact your healthcare provider if:   •Your symptoms do not get better with treatment.   •You have questions about your condition or care.  Medicines:   •Medicine may be given to help relieve your symptoms.  •Take your medicine as directed. Contact your healthcare provider if you think your medicine is not helping or if you have side effects. Tell him or her if you are allergic to any medicine. Keep a list of the medicines, vitamins, and herbs you take. Include the amounts, and when and why you take them. Bring the list or the pill bottles to follow-up visits. Carry your medicine list with you in case of an emergency.  Manage your symptoms:   •Do not drive, walk without help, or operate heavy machinery when you are dizzy.   •Move slowly when you move from one position to another position. Get up slowly from sitting or lying down. Sit or lie down right away if you feel dizzy.  •Drink plenty of liquids. Liquids help prevent dehydration. Ask how much liquid to drink each day and which liquids are best for you.  •Vestibular and balance rehabilitation therapy (VBRT) is used to teach you exercises to improve your balance and strength. These exercises may help decrease your vertigo and improve your balance. Ask for more information about this therapy.  Follow up with your healthcare provider as directed: Write down your questions so you remember to ask them during your visits.

## 2021-07-22 NOTE — ED STATDOCS - PROGRESS NOTE DETAILS
deondre: will dc after discussion w/ attending. Pt well appearing, no events on tele monitor w/ rhythm appropriate, feels well. given recent pacemaker eval 2 weeks ago suspect likely vertiginous 2/2 rapid positional change, will dc w/ meclizine. CTH neg, labs unremarkable, pt to f/u with primary.

## 2021-07-22 NOTE — ED STATDOCS - CLINICAL SUMMARY MEDICAL DECISION MAKING FREE TEXT BOX
89 y/o male presents with vertiginous sx for 2 days has had in the past, no fever, trauma or neck pain. No hx of stroke or arrythmia. Provoke x2 when rolling under table/bed. Improves with keeping head still. Daughter has hx of vertigo as well. No pain. Pacemaker evaluated 2 weeks ago by cards. Comprehensive neuro exam reassuring. able to walk several steps in the room. screening labs and CT suspect BPPV. Likely dc with meclizine rx and f/u. 89 y/o male presents with vertiginous sx for 2 days, has had in the past. No fever, trauma or neck pain. No hx of stroke or arrythmia. Provoked today x2 when rolling under table/bed. Improves with keeping head still. Daughter has hx of vertigo as well. No pain. Pacemaker evaluated 2 weeks ago by cards. Comprehensive neuro exam reassuring. Able to walk several steps in the room. Screening labs and CT. Suspect BPPV. Likely dc with meclizine rx and f/u.    - YADIEL

## 2021-07-22 NOTE — ED ADULT NURSE NOTE - OBJECTIVE STATEMENT
PT comes in complaining of dizziness and tremors since yesterday. Denies angina or sob. Hx pacemaker. AOx3. Daughter at bedside

## 2021-07-22 NOTE — ED STATDOCS - PATIENT PORTAL LINK FT
You can access the FollowMyHealth Patient Portal offered by [facility] by registering at the following website: [email or web address]. By joining [employer]’s FollowMyHealth portal, you will also be able to view your health information using other applications (apps) compatible with our system.

## 2021-07-22 NOTE — ED ADULT TRIAGE NOTE - CHIEF COMPLAINT QUOTE
Pt c/o dizziness that started yesterday.   Denies chest pain, headache, vision changes, numbness.  hx pacemaker

## 2021-10-06 ENCOUNTER — APPOINTMENT (OUTPATIENT)
Dept: DERMATOLOGY | Facility: CLINIC | Age: 86
End: 2021-10-06
Payer: MEDICARE

## 2021-10-06 DIAGNOSIS — L72.0 EPIDERMAL CYST: ICD-10-CM

## 2021-10-06 PROCEDURE — 99213 OFFICE O/P EST LOW 20 MIN: CPT | Mod: 25

## 2021-10-06 PROCEDURE — 17000 DESTRUCT PREMALG LESION: CPT

## 2021-10-06 NOTE — ASSESSMENT
[FreeTextEntry1] : A complete skin examination was performed.  There is no evidence of skin cancer.  We discussed the importance of photoprotection, including the use of hats, protective clothing and sunscreens with an SPF of at least 30.  Sun avoidance was also discussed.  The ABCDE's of melanoma was discussed.  Regular skin exams recommended.\par \par EIC of the right upper back.\par Benign.

## 2021-10-06 NOTE — HISTORY OF PRESENT ILLNESS
[FreeTextEntry1] : Patient presents for skin examination. [de-identified] : Denies new, changing, bleeding or tender lesions on the skin over the past year.\par

## 2021-10-06 NOTE — PHYSICAL EXAM
[FreeTextEntry3] : A full skin exam was performed including the scalp, face, neck, chest, abdomen, back, buttocks, upper extremities and lower extremities.  The patient declined examination of the breasts and genitalia.  \par The exam did show the following benign growths:\par Seborrheic keratoses.\par Lentigines.\par Cherry angioma.\par Cystic papule, right upper back.\par \par Keratotic papule, left dorsal hand (thumb).

## 2021-10-14 ENCOUNTER — APPOINTMENT (OUTPATIENT)
Dept: ELECTROPHYSIOLOGY | Facility: CLINIC | Age: 86
End: 2021-10-14
Payer: MEDICARE

## 2021-10-15 ENCOUNTER — NON-APPOINTMENT (OUTPATIENT)
Age: 86
End: 2021-10-15

## 2021-10-15 PROCEDURE — 93294 REM INTERROG EVL PM/LDLS PM: CPT

## 2021-10-15 PROCEDURE — 93296 REM INTERROG EVL PM/IDS: CPT

## 2022-01-17 ENCOUNTER — APPOINTMENT (OUTPATIENT)
Dept: ELECTROPHYSIOLOGY | Facility: CLINIC | Age: 87
End: 2022-01-17
Payer: MEDICARE

## 2022-01-18 ENCOUNTER — NON-APPOINTMENT (OUTPATIENT)
Age: 87
End: 2022-01-18

## 2022-01-18 PROCEDURE — 93294 REM INTERROG EVL PM/LDLS PM: CPT

## 2022-01-18 PROCEDURE — 93296 REM INTERROG EVL PM/IDS: CPT

## 2022-04-21 ENCOUNTER — APPOINTMENT (OUTPATIENT)
Dept: ELECTROPHYSIOLOGY | Facility: CLINIC | Age: 87
End: 2022-04-21
Payer: MEDICARE

## 2022-04-22 ENCOUNTER — NON-APPOINTMENT (OUTPATIENT)
Age: 87
End: 2022-04-22

## 2022-04-22 PROCEDURE — 93294 REM INTERROG EVL PM/LDLS PM: CPT

## 2022-04-22 PROCEDURE — 93296 REM INTERROG EVL PM/IDS: CPT

## 2022-07-21 ENCOUNTER — NON-APPOINTMENT (OUTPATIENT)
Age: 87
End: 2022-07-21

## 2022-07-21 ENCOUNTER — APPOINTMENT (OUTPATIENT)
Dept: ELECTROPHYSIOLOGY | Facility: CLINIC | Age: 87
End: 2022-07-21

## 2022-07-21 VITALS
WEIGHT: 170 LBS | DIASTOLIC BLOOD PRESSURE: 70 MMHG | OXYGEN SATURATION: 98 % | HEART RATE: 50 BPM | BODY MASS INDEX: 24.34 KG/M2 | SYSTOLIC BLOOD PRESSURE: 130 MMHG | HEIGHT: 70 IN | RESPIRATION RATE: 14 BRPM

## 2022-07-21 PROCEDURE — 93280 PM DEVICE PROGR EVAL DUAL: CPT

## 2022-10-03 ENCOUNTER — APPOINTMENT (OUTPATIENT)
Dept: DERMATOLOGY | Facility: CLINIC | Age: 87
End: 2022-10-03

## 2022-10-03 DIAGNOSIS — D18.00 HEMANGIOMA UNSPECIFIED SITE: ICD-10-CM

## 2022-10-03 PROCEDURE — 99213 OFFICE O/P EST LOW 20 MIN: CPT

## 2022-10-03 NOTE — H&P PST ADULT - PSYCHIATRIC
(V5) oriented negative Affect and characteristics of appearance, verbalizations, behaviors are appropriate

## 2022-10-03 NOTE — HISTORY OF PRESENT ILLNESS
[FreeTextEntry1] : Patient presents for skin examination. [de-identified] : Denies new, changing, bleeding or tender lesions on the skin over the past year.\par

## 2022-10-03 NOTE — PHYSICAL EXAM
[Alert] : alert [Oriented x 3] : ~L oriented x 3 [Well Nourished] : well nourished [Full Body Skin Exam Performed] : performed [FreeTextEntry3] : A full skin exam was performed including the scalp, face, neck, chest, abdomen, back, buttocks, upper extremities and lower extremities.  The patient declined examination of the breasts and genitalia.  \par The exam did show the following benign growths:\par Seborrheic keratoses.\par Lentigines.\par Cherry angioma.\par \par

## 2022-10-23 ENCOUNTER — APPOINTMENT (OUTPATIENT)
Dept: ELECTROPHYSIOLOGY | Facility: CLINIC | Age: 87
End: 2022-10-23

## 2022-10-24 ENCOUNTER — NON-APPOINTMENT (OUTPATIENT)
Age: 87
End: 2022-10-24

## 2022-10-24 PROCEDURE — 93296 REM INTERROG EVL PM/IDS: CPT

## 2022-10-24 PROCEDURE — 93294 REM INTERROG EVL PM/LDLS PM: CPT

## 2022-11-07 ENCOUNTER — APPOINTMENT (OUTPATIENT)
Dept: DERMATOLOGY | Facility: CLINIC | Age: 87
End: 2022-11-07

## 2022-11-18 NOTE — PHYSICAL THERAPY INITIAL EVALUATION ADULT - GENERAL OBSERVATIONS, REHAB EVAL
The pt was seen on POD 0 in the PACU. Right hemovac/autotrans, and bilateral venodynes present. 4 = No assist / stand by assistance

## 2023-01-22 ENCOUNTER — APPOINTMENT (OUTPATIENT)
Dept: ELECTROPHYSIOLOGY | Facility: CLINIC | Age: 88
End: 2023-01-22
Payer: MEDICARE

## 2023-01-23 ENCOUNTER — NON-APPOINTMENT (OUTPATIENT)
Age: 88
End: 2023-01-23

## 2023-01-23 PROCEDURE — 93294 REM INTERROG EVL PM/LDLS PM: CPT

## 2023-01-23 PROCEDURE — 93296 REM INTERROG EVL PM/IDS: CPT

## 2023-04-24 ENCOUNTER — NON-APPOINTMENT (OUTPATIENT)
Age: 88
End: 2023-04-24

## 2023-04-24 ENCOUNTER — APPOINTMENT (OUTPATIENT)
Dept: ELECTROPHYSIOLOGY | Facility: CLINIC | Age: 88
End: 2023-04-24
Payer: MEDICARE

## 2023-04-24 PROCEDURE — 93294 REM INTERROG EVL PM/LDLS PM: CPT

## 2023-04-24 PROCEDURE — 93296 REM INTERROG EVL PM/IDS: CPT

## 2023-07-20 ENCOUNTER — APPOINTMENT (OUTPATIENT)
Dept: ELECTROPHYSIOLOGY | Facility: CLINIC | Age: 88
End: 2023-07-20
Payer: MEDICARE

## 2023-07-20 ENCOUNTER — NON-APPOINTMENT (OUTPATIENT)
Age: 88
End: 2023-07-20

## 2023-07-20 VITALS
HEIGHT: 70 IN | SYSTOLIC BLOOD PRESSURE: 139 MMHG | OXYGEN SATURATION: 99 % | DIASTOLIC BLOOD PRESSURE: 92 MMHG | BODY MASS INDEX: 25.05 KG/M2 | WEIGHT: 175 LBS | HEART RATE: 73 BPM | RESPIRATION RATE: 14 BRPM

## 2023-07-20 DIAGNOSIS — I49.5 SICK SINUS SYNDROME: ICD-10-CM

## 2023-07-20 PROCEDURE — 93280 PM DEVICE PROGR EVAL DUAL: CPT

## 2023-07-20 RX ORDER — CLOBETASOL PROPIONATE 0.5 MG/G
0.05 CREAM TOPICAL
Qty: 1 | Refills: 0 | Status: DISCONTINUED | COMMUNITY
Start: 2020-07-25 | End: 2023-07-20

## 2023-08-18 ENCOUNTER — NON-APPOINTMENT (OUTPATIENT)
Age: 88
End: 2023-08-18

## 2023-08-22 ENCOUNTER — APPOINTMENT (OUTPATIENT)
Dept: DERMATOLOGY | Facility: CLINIC | Age: 88
End: 2023-08-22
Payer: MEDICARE

## 2023-08-22 VITALS — WEIGHT: 175 LBS | HEIGHT: 70 IN | BODY MASS INDEX: 25.05 KG/M2

## 2023-08-22 DIAGNOSIS — B07.9 VIRAL WART, UNSPECIFIED: ICD-10-CM

## 2023-08-22 PROCEDURE — 17110 DESTRUCTION B9 LES UP TO 14: CPT

## 2023-08-22 RX ORDER — POTASSIUM CHLORIDE 10 MEQ
10 CAPSULE, EXTENDED RELEASE ORAL
Refills: 0 | Status: ACTIVE | COMMUNITY

## 2023-08-22 RX ORDER — FUROSEMIDE 40 MG/1
40 TABLET ORAL
Refills: 0 | Status: ACTIVE | COMMUNITY

## 2023-08-22 NOTE — PHYSICAL EXAM
[FreeTextEntry3] : Keratotic erythematous verrucous papule, left dorsal hand, at the base of the thumb.

## 2023-09-15 RX ORDER — FINASTERIDE 5 MG/1
1 TABLET, FILM COATED ORAL
Qty: 0 | Refills: 0 | DISCHARGE

## 2023-09-18 ENCOUNTER — OUTPATIENT (OUTPATIENT)
Dept: OUTPATIENT SERVICES | Facility: HOSPITAL | Age: 88
LOS: 1 days | End: 2023-09-18
Payer: MEDICARE

## 2023-09-18 DIAGNOSIS — Z90.49 ACQUIRED ABSENCE OF OTHER SPECIFIED PARTS OF DIGESTIVE TRACT: Chronic | ICD-10-CM

## 2023-09-18 DIAGNOSIS — R06.02 SHORTNESS OF BREATH: ICD-10-CM

## 2023-09-18 DIAGNOSIS — Z90.89 ACQUIRED ABSENCE OF OTHER ORGANS: Chronic | ICD-10-CM

## 2023-09-18 DIAGNOSIS — I48.91 UNSPECIFIED ATRIAL FIBRILLATION: ICD-10-CM

## 2023-09-18 DIAGNOSIS — Z90.3 ACQUIRED ABSENCE OF STOMACH [PART OF]: Chronic | ICD-10-CM

## 2023-09-18 DIAGNOSIS — Z98.890 OTHER SPECIFIED POSTPROCEDURAL STATES: Chronic | ICD-10-CM

## 2023-09-18 PROCEDURE — 93005 ELECTROCARDIOGRAM TRACING: CPT | Mod: XU

## 2023-09-18 PROCEDURE — 92960 CARDIOVERSION ELECTRIC EXT: CPT

## 2023-09-18 PROCEDURE — 36415 COLL VENOUS BLD VENIPUNCTURE: CPT

## 2023-09-18 PROCEDURE — 80048 BASIC METABOLIC PNL TOTAL CA: CPT

## 2023-09-18 PROCEDURE — 85027 COMPLETE CBC AUTOMATED: CPT

## 2023-10-02 ENCOUNTER — NON-APPOINTMENT (OUTPATIENT)
Age: 88
End: 2023-10-02

## 2023-10-04 DIAGNOSIS — I48.91 UNSPECIFIED ATRIAL FIBRILLATION: ICD-10-CM

## 2023-10-04 DIAGNOSIS — R06.02 SHORTNESS OF BREATH: ICD-10-CM

## 2023-10-17 ENCOUNTER — APPOINTMENT (OUTPATIENT)
Dept: ELECTROPHYSIOLOGY | Facility: CLINIC | Age: 88
End: 2023-10-17
Payer: MEDICARE

## 2023-10-17 VITALS
HEART RATE: 73 BPM | DIASTOLIC BLOOD PRESSURE: 79 MMHG | SYSTOLIC BLOOD PRESSURE: 114 MMHG | WEIGHT: 175 LBS | BODY MASS INDEX: 25.05 KG/M2 | HEIGHT: 70 IN | OXYGEN SATURATION: 98 %

## 2023-10-17 DIAGNOSIS — I48.0 PAROXYSMAL ATRIAL FIBRILLATION: ICD-10-CM

## 2023-10-17 DIAGNOSIS — Z95.0 PRESENCE OF CARDIAC PACEMAKER: ICD-10-CM

## 2023-10-17 PROCEDURE — 99212 OFFICE O/P EST SF 10 MIN: CPT

## 2023-10-17 PROCEDURE — 93280 PM DEVICE PROGR EVAL DUAL: CPT

## 2023-10-17 RX ORDER — AMIODARONE HYDROCHLORIDE 400 MG/1
400 TABLET ORAL
Qty: 10 | Refills: 0 | Status: ACTIVE | COMMUNITY
Start: 2023-10-17 | End: 1900-01-01

## 2023-10-17 RX ORDER — AMIODARONE HYDROCHLORIDE 200 MG/1
200 TABLET ORAL DAILY
Qty: 90 | Refills: 3 | Status: ACTIVE | COMMUNITY
Start: 2023-10-17 | End: 1900-01-01

## 2023-10-19 LAB
ALBUMIN SERPL ELPH-MCNC: 4.3 G/DL
ALP BLD-CCNC: 76 U/L
ALT SERPL-CCNC: 21 U/L
ANION GAP SERPL CALC-SCNC: 10 MMOL/L
AST SERPL-CCNC: 25 U/L
BILIRUB SERPL-MCNC: 0.6 MG/DL
BUN SERPL-MCNC: 29 MG/DL
CALCIUM SERPL-MCNC: 9.7 MG/DL
CHLORIDE SERPL-SCNC: 104 MMOL/L
CO2 SERPL-SCNC: 31 MMOL/L
CREAT SERPL-MCNC: 1.16 MG/DL
EGFR: 59 ML/MIN/1.73M2
GLUCOSE SERPL-MCNC: 78 MG/DL
POTASSIUM SERPL-SCNC: 4.2 MMOL/L
PROT SERPL-MCNC: 6.6 G/DL
SODIUM SERPL-SCNC: 145 MMOL/L
TSH SERPL-ACNC: 1.27 UIU/ML

## 2023-10-23 ENCOUNTER — APPOINTMENT (OUTPATIENT)
Dept: DERMATOLOGY | Facility: CLINIC | Age: 88
End: 2023-10-23
Payer: MEDICARE

## 2023-10-23 VITALS — BODY MASS INDEX: 25.05 KG/M2 | HEIGHT: 70 IN | WEIGHT: 175 LBS

## 2023-10-23 DIAGNOSIS — D18.01 HEMANGIOMA OF SKIN AND SUBCUTANEOUS TISSUE: ICD-10-CM

## 2023-10-23 DIAGNOSIS — L81.4 OTHER MELANIN HYPERPIGMENTATION: ICD-10-CM

## 2023-10-23 DIAGNOSIS — L82.1 OTHER SEBORRHEIC KERATOSIS: ICD-10-CM

## 2023-10-23 DIAGNOSIS — L57.0 ACTINIC KERATOSIS: ICD-10-CM

## 2023-10-23 PROCEDURE — 17003 DESTRUCT PREMALG LES 2-14: CPT | Mod: 59

## 2023-10-23 PROCEDURE — 99213 OFFICE O/P EST LOW 20 MIN: CPT | Mod: 25

## 2023-10-23 PROCEDURE — 17000 DESTRUCT PREMALG LESION: CPT

## 2023-10-24 ENCOUNTER — OUTPATIENT (OUTPATIENT)
Dept: OUTPATIENT SERVICES | Facility: HOSPITAL | Age: 88
LOS: 1 days | End: 2023-10-24
Payer: MEDICARE

## 2023-10-24 DIAGNOSIS — Z90.49 ACQUIRED ABSENCE OF OTHER SPECIFIED PARTS OF DIGESTIVE TRACT: Chronic | ICD-10-CM

## 2023-10-24 DIAGNOSIS — Z98.890 OTHER SPECIFIED POSTPROCEDURAL STATES: Chronic | ICD-10-CM

## 2023-10-24 DIAGNOSIS — Z90.3 ACQUIRED ABSENCE OF STOMACH [PART OF]: Chronic | ICD-10-CM

## 2023-10-24 DIAGNOSIS — Z90.89 ACQUIRED ABSENCE OF OTHER ORGANS: Chronic | ICD-10-CM

## 2023-10-24 PROCEDURE — 93005 ELECTROCARDIOGRAM TRACING: CPT | Mod: XU

## 2023-10-24 PROCEDURE — 92960 CARDIOVERSION ELECTRIC EXT: CPT

## 2023-10-24 RX ORDER — CHOLECALCIFEROL (VITAMIN D3) 125 MCG
1 CAPSULE ORAL
Qty: 0 | Refills: 0 | DISCHARGE

## 2023-10-25 DIAGNOSIS — I48.0 PAROXYSMAL ATRIAL FIBRILLATION: ICD-10-CM

## 2023-10-26 DIAGNOSIS — I48.0 PAROXYSMAL ATRIAL FIBRILLATION: ICD-10-CM

## 2023-11-07 ENCOUNTER — NON-APPOINTMENT (OUTPATIENT)
Age: 88
End: 2023-11-07

## 2023-11-19 ENCOUNTER — TRANSCRIPTION ENCOUNTER (OUTPATIENT)
Age: 88
End: 2023-11-19

## 2023-11-20 ENCOUNTER — TRANSCRIPTION ENCOUNTER (OUTPATIENT)
Age: 88
End: 2023-11-20

## 2023-11-21 ENCOUNTER — APPOINTMENT (OUTPATIENT)
Dept: ELECTROPHYSIOLOGY | Facility: CLINIC | Age: 88
End: 2023-11-21

## 2024-02-19 ENCOUNTER — APPOINTMENT (OUTPATIENT)
Dept: ELECTROPHYSIOLOGY | Facility: CLINIC | Age: 89
End: 2024-02-19
Payer: MEDICARE

## 2024-02-19 ENCOUNTER — NON-APPOINTMENT (OUTPATIENT)
Age: 89
End: 2024-02-19

## 2024-02-20 PROCEDURE — 93294 REM INTERROG EVL PM/LDLS PM: CPT

## 2024-02-20 PROCEDURE — 93296 REM INTERROG EVL PM/IDS: CPT

## 2024-05-21 ENCOUNTER — APPOINTMENT (OUTPATIENT)
Dept: ELECTROPHYSIOLOGY | Facility: CLINIC | Age: 89
End: 2024-05-21

## 2024-07-23 ENCOUNTER — APPOINTMENT (OUTPATIENT)
Dept: ELECTROPHYSIOLOGY | Facility: CLINIC | Age: 89
End: 2024-07-23

## 2024-10-09 ENCOUNTER — APPOINTMENT (OUTPATIENT)
Dept: DERMATOLOGY | Facility: CLINIC | Age: 89
End: 2024-10-09
Payer: MEDICARE

## 2024-10-09 DIAGNOSIS — Z86.018 PERSONAL HISTORY OF OTHER BENIGN NEOPLASM: ICD-10-CM

## 2024-10-09 DIAGNOSIS — L72.0 EPIDERMAL CYST: ICD-10-CM

## 2024-10-09 DIAGNOSIS — D48.5 NEOPLASM OF UNCERTAIN BEHAVIOR OF SKIN: ICD-10-CM

## 2024-10-09 DIAGNOSIS — D18.01 HEMANGIOMA OF SKIN AND SUBCUTANEOUS TISSUE: ICD-10-CM

## 2024-10-09 DIAGNOSIS — L81.4 OTHER MELANIN HYPERPIGMENTATION: ICD-10-CM

## 2024-10-09 DIAGNOSIS — L82.1 OTHER SEBORRHEIC KERATOSIS: ICD-10-CM

## 2024-10-09 PROCEDURE — 99213 OFFICE O/P EST LOW 20 MIN: CPT | Mod: 25

## 2024-10-09 PROCEDURE — 11102 TANGNTL BX SKIN SINGLE LES: CPT

## 2024-10-14 LAB — CORE LAB BIOPSY: NORMAL

## 2024-10-22 ENCOUNTER — APPOINTMENT (OUTPATIENT)
Dept: DERMATOLOGY | Facility: CLINIC | Age: 89
End: 2024-10-22
Payer: MEDICARE

## 2024-10-22 DIAGNOSIS — D04.4 CARCINOMA IN SITU OF SKIN OF SCALP AND NECK: ICD-10-CM

## 2024-10-22 PROCEDURE — 17272 DSTR MAL LES S/N/H/F/G 1.1-2: CPT

## 2025-04-30 ENCOUNTER — APPOINTMENT (OUTPATIENT)
Dept: DERMATOLOGY | Facility: CLINIC | Age: 89
End: 2025-04-30